# Patient Record
Sex: FEMALE | Race: WHITE | Employment: UNEMPLOYED | URBAN - METROPOLITAN AREA
[De-identification: names, ages, dates, MRNs, and addresses within clinical notes are randomized per-mention and may not be internally consistent; named-entity substitution may affect disease eponyms.]

---

## 2020-10-09 ENCOUNTER — APPOINTMENT (OUTPATIENT)
Dept: PHYSICAL THERAPY | Age: 50
End: 2020-10-09

## 2020-11-10 ENCOUNTER — HOSPITAL ENCOUNTER (OUTPATIENT)
Dept: PHYSICAL THERAPY | Age: 50
Discharge: HOME | End: 2020-11-10
Payer: OTHER GOVERNMENT

## 2020-11-10 ENCOUNTER — APPOINTMENT (OUTPATIENT)
Dept: PHYSICAL THERAPY | Age: 50
End: 2020-11-10

## 2020-11-10 PROCEDURE — 97112 NEUROMUSCULAR REEDUCATION: CPT

## 2020-11-10 PROCEDURE — 97162 PT EVAL MOD COMPLEX 30 MIN: CPT

## 2020-11-10 NOTE — PROGRESS NOTES
Nicolle Mccarty 32  [facility] AT [address], [address], [address] - Phone: [phone]  Fax: [phone] / [facility]  Patient Name: Tristen Palmer : 1970   Medical   Diagnosis: Dyspareunia, female [N94.10]  Urge incontinence [N39.41] Treatment Diagnosis: Dyspareunia, female [N94.10]  Urge incontinence [N39.41]   Onset Date: 2019     Referral Source: Fabiola Calderon [facility]): 11/10/2020   Prior Hospitalization: See medical history Provider #: [id number]   Prior Level of Function: Symptom free with ADLs   Comorbidities: G2, P4, Asthma, Bladder sling with mesh x2, Uterine ablation, Tummy tuck   Medications: Verified on Patient Summary List   The Plan of Care and following information is based on the information from the initial evaluation.   ==================================================================================  Assessment / key information:  Patient is a 53 y.o. yo female who presents to [facility] with diagnosis of Dyspareunia, female [N94.10]  Urge incontinence [N39.41]. Patient reports left labia, pubic and groin pain with sitting > 3-4 hours and with sexual intercourse in missionary position, difficulty achieving orgasm and urgency with inability to delay urination at all several times weekly 3-5. Rochelle Boop Patient is G2, P4 with  section delivery of  triplets and 1 vaginal deliveries. She had surgery for bladder sling with mesh  for severe urinary incontinence but had complications afterward. She underwent removal of mesh with new bladder sling 2020. Upon objective evaluation, patient demonstrates pelvic floor dysfunction. There was tenderness to palpation over left ischiocavernous and pubic scar. Strength of pelvic floor muscles was impaired at 2/5 with PERF score 2/3/10/10.    On biofeedback patient presented with elevated resting tone of pelvic floor at  4.16 microvolts. There was low net rise of fast twitch contraction at 21.6 microvolts and low net rise of slow twitch contraction at 12.6 microvolts. Patient scored 64 on FOTO/Urinary problem indicating decreased quality of life. Patient can benefit from PT for retraining of muscle control and relaxation on biofeedback, manual therapy, therapeutic exercises and modalities to decrease muscular tenderness and pain, Increase ability to engage in sex, achieve orgasm and delay urination to improve quality of life.  ==================================================================================  Eval Complexity: History: HIGH Complexity :3+ comorbidities / personal factors will impact the outcome/ POC Exam:HIGH Complexity : 4+ Standardized tests and measures addressing body structure, function, activity limitation and / or participation in recreation  Presentation: MEDIUM Complexity : Evolving with changing characteristics  Clinical Decision Making:MEDIUM Complexity : FOTO score of 26-74Overall Complexity:MEDIUM  Problem List: Pelvic pain/dysfunction, Decreased pelvic floor mm awareness, Decreased pelvic floor mm strength, Hypertonus of pelvic floor, Urinary urgency and Other   Treatment Plan may include any combination of the following: Therapeutic exercise, Urge suppression techniques, Neuromuscular re-education, Manual therapy, Physical agent/modality and Patient education  Patient / Family readiness to learn indicated by: asking questions, trying to perform skills and interest  Persons(s) to be included in education: patient (P)  Barriers to Learning/Limitations: None  Measures taken:    Patient Goal (s): No pain with sex or extended sitting, easier time achieving orgasm, no urgency or leaking   Patient self reported health status: excellent  Rehabilitation Potential: good  Short Term Goals: To be accomplished in 4 weeks:     1) Patient performing pelvic floor exercises 3x day. 2) Patient will report 20% improvement in pain with sexual intercourse. 3) Patient using urge suppression techniques     4) Increase net rise of slow twitch contraction to 15 microvolts to increase pelvic support . Long Term Goals: To be accomplished in 8 weeks:   1) Patient independent in HEP. 2) Patient will report 40% improvement in pain with sexual intercourse. 3) Increase score on FOTO/Urinary problem to 70 to indicate improved quality of life. 4) Increase net rise of slow twitch contraction to 17 microvolts to increase pelvic support. Frequency / Duration:   Patient to be seen  1  times per week for 8  weeks:  Patient / Caregiver education and instruction: Pain Management, Exercises and Bladder Retraining  G-Codes (GP): na  Therapist Signature: Chinyere Lopez PT Date: 29/56/7466   Certification Period: na Time: 10:55 AM   ==================================================================================  I certify that the above Physical Therapy Services are being furnished while the patient is under my care. I agree with the treatment plan and certify that this therapy is necessary. Physician Signature:        Date:       Time:     Please sign and return to [facility] or you may fax the signed copy to [phone]. Thank you.

## 2020-11-10 NOTE — PROGRESS NOTES
PELVIC FLOOR DAILY TREATMENT NOTE 8-14    Patient Name: Rondel Primrose  Date:11/10/2020  : 1970  [x]  Patient  Verified  Payor:  / Plan: Jocelyn Chaidez / Product Type:  /    In time:10:51  Out time:12:00  Total Treatment Time (min): 69  Total Timed Codes (min): na  1:1 Treatment Time (min): na   Visit #: 1 of 8    Treatment Area: Dyspareunia, female [N94.10]  Urge incontinence [N39.41]    SUBJECTIVE  Pain Level (0-10 scale): 0  Any medication changes, allergies to medications, adverse drug reactions, diagnosis change, or new procedure performed?: [x] No    [] Yes (see summary sheet for update)  Subjective functional status/changes:   [] No changes reported  See POC    OBJECTIVE          15 min Patient Education: [x] Review HEP    [] Progressed/Changed HEP based on: Educated Pt in pelvic floor anatomy, function/dysfunction and correct execution of a pelvic floor contraction. Reviewed biofeedback results. [] positioning   [] body mechanics   [] transfers   [] heat/ice application          Pain Level (0-10 scale) post treatment: 0    ASSESSMENT/Changes in Function: Justification for Eval Code Complexity:  Patient History : See POC  Examination see exam   Clinical Presentation: evolving  Clinical Decision Making : FOTO : 59 /100    Patient will continue to benefit from skilled PT services to modify and progress therapeutic interventions, address functional mobility deficits, address strength deficits, analyze and address soft tissue restrictions, instruct in home and community integration and address dyspareunia and UI to attain remaining goals. [x]  See Plan of Care  []  See progress note/recertification  []  See Discharge Summary         Progress towards goals / Updated goals:  Initial evaluation completed with home exercise program and education initiated.        PLAN  [x]  Upgrade activities as tolerated     []  Continue plan of care  []  Update interventions per flow sheet []  Discharge due to:_  []  Other:_      Adrienne García, PT 11/10/2020  10:51 AM      Future Appointments   Date Time Provider Felix Vernon   12/18/2020  2:15 PM Cleo Dunlap, PT [facility]   12/23/2020  2:45 PM [facility] Dunlap, [facility]   12/30/2020  2:45 PM Cleo Dunlap, PT [facility]   1/6/2021  2:45 PM Cleo Dunlap, PT [facility]   1/13/2021  2:45 PM Cleo Dunlap, PT [facility]   1/20/2021  2:45 PM Cleo Dunlap, PT [facility]

## 2020-12-08 ENCOUNTER — HOSPITAL ENCOUNTER (OUTPATIENT)
Dept: PHYSICAL THERAPY | Age: 50
Discharge: HOME | End: 2020-12-08
Payer: OTHER GOVERNMENT

## 2020-12-08 PROCEDURE — 97112 NEUROMUSCULAR REEDUCATION: CPT

## 2020-12-08 PROCEDURE — 97140 MANUAL THERAPY 1/> REGIONS: CPT

## 2020-12-08 NOTE — PROGRESS NOTES
PELVIC FLOOR DAILY TREATMENT NOTE 8-    Patient Name: Emilee Hernandez  Date:2020  : 1970  [x]  Patient  Verified  Payor:  / Plan: Diomedes Gan 74 / Product Type:  /    In time:11:21  Out time:12:06  Total Treatment Time (min): 45  Total Timed Codes (min): na  1:1 Treatment Time (min): na   Visit #: 2 of 8    Treatment Area: Dyspareunia, female [N94.10]  Urge incontinence [N39.41]    SUBJECTIVE  Pain Level (0-10 scale): 0  Any medication changes, allergies to medications, adverse drug reactions, diagnosis change, or new procedure performed?: [x] No    [] Yes (see summary sheet for update)  Subjective functional status/changes:   [] No changes reported  Patient reports doing HEP 2-3x day. Functional improvements include patient reporting decreased urgency. Functional impairments include strength of orgasm. OBJECTIVE  Modality rationale: Neuromuscular reeducation to improve the patients  Patient reports left labia, pubic and groin pain with sitting > 3-4 hours and with sexual intercourse in [address] position, difficulty achieving orgasm and urgency with inability to delay urination at all several times weekly 3-5. .  .    Min Type Additional Details   25 [x] Biofeedback x 25 minutes    supine surface    [] Estim: []Att   []Unatt        []TENS instruct                  []IFC  []Premod   []NMES                     []Other:  []w/US   []w/ice   []w/heat  Position:  Location:    []  Traction: [] Cervical       []Lumbar                       [] Prone          []Supine                       []Intermittent   []Continuous Lbs:  [] before manual  [] after manual    []  Ultrasound: []Continuous   [] Pulsed                           []1MHz   []3MHz Location:  W/cm2:    []  Iontophoresis with dexamethasone         Location: [] Take home patch   [] In clinic    []  Ice     []  heat  []  Ice massage Position:  Location:    []  Vasopneumatic Device Pressure:       [] lo [] med [] hi Temperature: [] lo [] med [] hi   [x] Skin assessment post-treatment:  [x]intact []redness- no adverse reaction       []redness  adverse reaction:     na min Therapeutic Exercise:  [] See flow sheet :  []  Pelvic floor strengthening                []  Pelvic floor downtraining  []  Quality pelvic floor contractions      []  Relaxation techniques  []  Urge suppression exercises  []  Other: Core strengthening   Rationale: na to improve the patients ability to na     min Therapeutic Activity:  []  See flow sheet :   Rationale:        20 min Manual Therapy: Release to left ischiocavernosis muscle. Scar mobs to pubic scar. Patient educated in self massage and scar mos. Rationale: Increase tissue extensibility to improve patient's ability to engage in sexual intercourse and to sit > 3-4 hours      na min Bladder Training: [] voiding schedule          [] program progression                                  [] decrease every na minutes/hours            min Patient Education: [x] Review HEP    [] Progressed/Changed HEP based on: Patient to perform HEP in supine/sitting position. Increase slow twitch contraction to 5second holds. Scar mobs 1x day, self massage in perineum 3x week. [] positioning   [] body mechanics   [] transfers   [] heat/ice application        Other Objective/Functional Measures:    [x]baseline resting tone: na   [x]slow twitch mms 40. 2(28.3) in supine. [x]fast twitch mms 55(41.5)    Pain Level (0-10 scale) post treatment: 0    ASSESSMENT/Changes in Function: Patient demonstrates improved strength of pelvic floor muscles with decreased urgency. Patient will continue to benefit from skilled PT services to modify and progress therapeutic interventions, address functional mobility deficits, address strength deficits, analyze and address soft tissue restrictions, instruct in home and community integration and address dyspareunia and UI to attain remaining goals.        []  See Plan of Care  [] See progress note/recertification  []  See Discharge Summary         Progress towards goals / Updated goals:               1) Patient performing pelvic floor exercises 3x day. Progressing               2) Patient will report 20% improvement in pain with sexual intercourse. 3) Patient using urge suppression techniques                 4) Increase net rise of slow twitch contraction to 15 microvolts to increase pelvic support .  Met    PLAN  [x]  Upgrade activities as tolerated     []  Continue plan of care  []  Update interventions per flow sheet       []  Discharge due to:_  []  Other:_      Bradley Hankins, PT 12/8/2020  12:06 PM      Future Appointments   Date Time Provider Felix Vernon   12/8/2020 11:15 AM Zoya Kuwaiti, PT [facility] [address]   12/18/2020  2:15 PM Zoya Kuwaiti, PT [facility] [address]   12/23/2020  2:45 PM Zoya Kuwaiti, PT [facility] [address]   12/30/2020  2:45 PM Zoya Kuwaiti, PT [facility] [address]   1/6/2021  2:45 PM Zoya Kuwaiti, PT [facility] [address]   1/13/2021  2:45 PM Zoya Kuwaiti, PT [facility] [address]   1/20/2021  2:45 PM Zoya Kuwaiti, PT [facility] [address]

## 2020-12-18 ENCOUNTER — HOSPITAL ENCOUNTER (OUTPATIENT)
Dept: PHYSICAL THERAPY | Age: 50
Discharge: HOME | End: 2020-12-18
Payer: OTHER GOVERNMENT

## 2020-12-18 PROCEDURE — 97112 NEUROMUSCULAR REEDUCATION: CPT

## 2020-12-18 PROCEDURE — 97140 MANUAL THERAPY 1/> REGIONS: CPT

## 2020-12-18 NOTE — PROGRESS NOTES
PELVIC FLOOR DAILY TREATMENT NOTE 8-14    Patient Name: Mickie Matthews  Date:2020  : 1970  [x]  Patient  Verified  Payor:  / Plan: Diomedes Gan 74 / Product Type:  /    In time: 2:25  Out time: 3:20  Total Treatment Time (min): 55  Total Timed Codes (min): na  1:1 Treatment Time (min): na   Visit #: 3 of 8    Treatment Area: Dyspareunia, female [N94.10]  Urge incontinence [N39.41]    SUBJECTIVE  Pain Level (0-10 scale): 0  Any medication changes, allergies to medications, adverse drug reactions, diagnosis change, or new procedure performed?: [x] No    [] Yes (see summary sheet for update)  Subjective functional status/changes:   [] No changes reported  See PN    OBJECTIVE  Modality rationale: Neuromuscular reeducation to improve the patients  left labia, pubic and groin pain with sitting > 3-4 hours and with sexual intercourse in missionary position, difficulty achieving orgasm and urgency with inability to delay urination at all several times weekly 3-5.    Min Type Additional Details   45 [x] Biofeedback x 45 minutes    supine surface    [] Estim: []Att   []Unatt        []TENS instruct                  []IFC  []Premod   []NMES                     []Other:  []w/US   []w/ice   []w/heat  Position:  Location:    []  Traction: [] Cervical       []Lumbar                       [] Prone          []Supine                       []Intermittent   []Continuous Lbs:  [] before manual  [] after manual    []  Ultrasound: []Continuous   [] Pulsed                           []1MHz   []3MHz Location:  W/cm2:    []  Iontophoresis with dexamethasone         Location: [] Take home patch   [] In clinic    []  Ice     []  heat  []  Ice massage Position:  Location:    []  Vasopneumatic Device Pressure:       [] lo [] med [] hi   Temperature: [] lo [] med [] hi   [x] Skin assessment post-treatment:  [x]intact []redness- no adverse reaction       []redness  adverse reaction:     na min Therapeutic Exercise:  [] See flow sheet :  []  Pelvic floor strengthening                []  Pelvic floor downtraining  []  Quality pelvic floor contractions      []  Relaxation techniques  []  Urge suppression exercises  []  Other: Core strengthening   Rationale: na to improve the patients ability to na     min Therapeutic Activity:  []  See flow sheet :   Rationale:        10 min Manual Therapy: Release to left ischiocavernosis muscle. Scar mobs to pubic scar. Continued patient education in self massage and scar mos. Rationale: Increase tissue extensibility to improve patient's ability to engage in sexual intercourse and to sit > 3-4 hours      na min Bladder Training: [] voiding schedule          [] program progression                                  [] decrease every na minutes/hours           with NM min Patient Education: [x] Review HEP    [] Progressed/Changed HEP based on: Patient to perform HEP in supine/sitting position. Increase slow twitch contraction to 7 second holds. Scar mobs 1x day, self massage in perineum 1x week. Education on relationship of pelvic floor muscle strength to orgasm. [] positioning   [] body mechanics   [] transfers   [] heat/ice application        Other Objective/Functional Measures:    [x]baseline resting tone: na   [x]slow twitch mms 38.5(29.2) in supine. [x]fast twitch mms 44. 6(24.6)    Pain Level (0-10 scale) post treatment: 0    ASSESSMENT/Changes in Function: See PN    Patient will continue to benefit from skilled PT services to modify and progress therapeutic interventions, address functional mobility deficits, address strength deficits, analyze and address soft tissue restrictions, instruct in home and community integration and address dyspareunia and UI to attain remaining goals.        []  See Plan of Care  [x]  See progress note/recertification  []  See Discharge Summary         Progress towards goals / Updated goals:              See PN  PLAN  [x]  Upgrade activities as tolerated     []  Continue plan of care  []  Update interventions per flow sheet       []  Discharge due to:_  []  Other:_      Alexandra Small, PT 12/18/2020  3:20 PM      Future Appointments   Date Time Provider Felix Vernon   12/18/2020  2:15 PM Cotyasha De Jesus, PT [facility]   12/23/2020  2:45 PM Cotyasha De Jesus, PT [facility]   12/30/2020  2:45 PM Cotyasha De Jesus, PT [facility]   1/6/2021  2:45 PM Coty De Jesus, PT [facility]   1/13/2021  2:45 PM Coty De Jesus, PT [facility]   1/20/2021  2:45 PM Coty De Jesus, PT [facility]

## 2020-12-18 NOTE — PROGRESS NOTES
[address] [place] [facility]AP[facility]  [x]  At [facility] [address] [place] [address] - Phone: [phone] Fax: [phone]    PROGRESS NOTE  Patient Name: Rosalba Moore : 1970   Treatment/Medical Diagnosis: Dyspareunia, female [N94.10]  Urge incontinence [N39.41]   Referral Source: Dianna Velázquez MD     Date of Initial Visit: 11/10/2020 Attended Visits: 3 Missed Visits: 0   SUMMARY OF TREATMENT  PT has consisted of pelvic floor relaxation/strengthening via biofeedback, education as to pelvic floor anatomy and function/self massage/scar mobilization , manual therapy and home exercise program.   CURRENT STATUS  Patient has made good progress in PT with short term goals either met or ongoing. Functional progress Includes patient reporting no pain with sexual intercourse. Patient demonstrates improved pelvic floor muscle strength. Functional impairments include decreased intensity of orgasms and urinary urgency. Goal/Measure of Progress Goal Met? 1. Patient performing pelvic floor exercises 3x day   Status at last Eval: na Current Status: 3x day yes   2. Patient will report 20% improvement in pain with sexual intercourse. Status at last Eval: na Current Status: No pain with sexual intercourse yes   3. Patient using urge suppression techniques                 4) Increase net rise of slow twitch contraction to 15 microvolts to increase pelvic support . Status at last Eval: Na  12.6 Current Status: Ongoing  29.2 Ongoing  yes   New Goals to be achieved in __4__  weeks:                 1) Patient independent in HEP. 2) Patient will report 25% improvement in intensity of orgasm with sexual intercourse. 3) Increase score on FOTO/Urinary problem to 70 to indicate improved quality of life. 4) Increase net rise of slow twitch contraction to 32 microvolts to increase pelvic support. 5) Patient using urge suppression techniques. RECOMMENDATIONS  Continue pelvic floor PT 1x week for 4 weeks. If you have any questions/comments please contact us directly at [phone]. Thank you for allowing us to assist in the care of your patient. Therapist Signature: Amada Overton PT Date: 12/18/2020     Time: 7:31 AM   NOTE TO PHYSICIAN:  PLEASE COMPLETE THE ORDERS BELOW AND FAX TO   [facility]: [phone]. If you are unable to process this request in 24 hours please contact our office: [phone]    ___ I have read the above report and request that my patient continue as recommended.   ___ I have read the above report and request that my patient continue therapy with the following changes/special instructions:_________________________________________________________   ___ I have read the above report and request that my patient be discharged from therapy.      Physician Signature:        Date:       Time:

## 2020-12-23 ENCOUNTER — APPOINTMENT (OUTPATIENT)
Dept: PHYSICAL THERAPY | Age: 50
End: 2020-12-23
Payer: OTHER GOVERNMENT

## 2020-12-30 ENCOUNTER — APPOINTMENT (OUTPATIENT)
Dept: PHYSICAL THERAPY | Age: 50
End: 2020-12-30
Payer: OTHER GOVERNMENT

## 2021-01-06 ENCOUNTER — APPOINTMENT (OUTPATIENT)
Dept: PHYSICAL THERAPY | Age: 51
End: 2021-01-06
Payer: OTHER GOVERNMENT

## 2021-01-13 ENCOUNTER — HOSPITAL ENCOUNTER (OUTPATIENT)
Dept: PHYSICAL THERAPY | Age: 51
Discharge: HOME | End: 2021-01-13
Payer: OTHER GOVERNMENT

## 2021-01-13 PROCEDURE — 97140 MANUAL THERAPY 1/> REGIONS: CPT

## 2021-01-13 PROCEDURE — 97112 NEUROMUSCULAR REEDUCATION: CPT

## 2021-01-13 NOTE — PROGRESS NOTES
PELVIC FLOOR DAILY TREATMENT NOTE 8-14    Patient Name: Ramila Mccann  Date:2021  : 1970  [x]  Patient  Verified  Payor:  / Plan: Diomedes Gan 74 / Product Type:  /    In time: 2:33  Out time: 3:20  Total Treatment Time (min): 47  Total Timed Codes (min): na  1:1 Treatment Time (min): na   Visit #: 4 of 8    Treatment Area: Dyspareunia, female [N94.10]  Urge incontinence [N39.41]    SUBJECTIVE  Pain Level (0-10 scale): 0  Any medication changes, allergies to medications, adverse drug reactions, diagnosis change, or new procedure performed?: [] No    [x] Yes (see summary sheet for update)  Subjective functional status/changes:   [] No changes reported  Patient reports that she had covid. Began 2020. Tested + on the 2020. Developed pneumonia after 12 days. Has some residual decreased lung function and fatigue. Okayed to be in the public by MD.     OBJECTIVE  Modality rationale: Neuromuscular reeducation to improve the patients  left labia, pubic and groin pain with sitting > 3-4 hours and with sexual intercourse in missionary position, difficulty achieving orgasm and urgency with inability to delay urination at all several times weekly 3-5.    Min Type Additional Details   30 [x] Biofeedback x 30 minutes    supine surface    [] Estim: []Att   []Unatt        []TENS instruct                  []IFC  []Premod   []NMES                     []Other:  []w/US   []w/ice   []w/heat  Position:  Location:    []  Traction: [] Cervical       []Lumbar                       [] Prone          []Supine                       []Intermittent   []Continuous Lbs:  [] before manual  [] after manual    []  Ultrasound: []Continuous   [] Pulsed                           []1MHz   []3MHz Location:  W/cm2:    []  Iontophoresis with dexamethasone         Location: [] Take home patch   [] In clinic    []  Ice     []  heat  []  Ice massage Position:  Location:    []  Vasopneumatic Device Pressure:       [] lo [] med [] hi   Temperature: [] lo [] med [] hi   [x] Skin assessment post-treatment:  [x]intact []redness- no adverse reaction       []redness - adverse reaction:     na min Therapeutic Exercise:  [] See flow sheet :  []  Pelvic floor strengthening                []  Pelvic floor downtraining  []  Quality pelvic floor contractions      []  Relaxation techniques  []  Urge suppression exercises  []  Other: Core strengthening   Rationale: na to improve the patients ability to na     min Therapeutic Activity:  []  See flow sheet :   Rationale:        17 min Manual Therapy: Release to left ischiocavernosis muscle. Scar mobs to pubic scar. Continued patient education in self massage and scar mos. Rationale: Increase tissue extensibility to improve patient's ability to engage in sexual intercourse and to sit > 3-4 hours      na min Bladder Training: [] voiding schedule          [] program progression                                  [] decrease every na minutes/hours            min Patient Education: [x] Review HEP    [] Progressed/Changed HEP based on: Patient to perform HEP in supine/sitting position. Decrease slow twitch contraction to 5 second holds. Continue scar mobs 1x day, self massage in perineum 1x week. [] positioning   [] body mechanics   [] transfers   [] heat/ice application        Other Objective/Functional Measures:    [x]baseline resting tone: na   [x]slow twitch mms 47. 9(40.3) in supine. [x]fast twitch mms 58.7(43.7)    Pain Level (0-10 scale) post treatment: 0    ASSESSMENT/Changes in Function: Increased net rise of both fast and slow twitch contractions but poor quality of slow twitch so decreased hold to 5 seconds. .    Patient will continue to benefit from skilled PT services to modify and progress therapeutic interventions, address functional mobility deficits, address strength deficits, analyze and address soft tissue restrictions, instruct in home and community integration and address dyspareunia and UI to attain remaining goals. []  See Plan of Care  []  See progress note/recertification  []  See Discharge Summary         Progress towards goals / Updated goals:              1) Patient independent in HEP.                 2) Patient will report 25% improvement in intensity of orgasm with sexual intercourse.                 3) Increase score on FOTO/Urinary problem to 70 to indicate improved quality of life.                 4) Increase net rise of slow twitch contraction to 32 microvolts to increase pelvic support. Met               5) Patient using urge suppression techniques.               PLAN  [x]  Upgrade activities as tolerated     []  Continue plan of care  []  Update interventions per flow sheet       []  Discharge due to:_  []  Other:_      Lissa Lee, PT 1/13/2021  3:20 PM      Future Appointments   Date Time Provider Felix Vernon   1/20/2021  2:45 PM Andrew Iraheta, PT [facility]   2/5/2021 10:45 AM Andrew Iraheta PT [facility]   2/10/2021  1:15 PM Andrew Iraheta PT [facility]   2/24/2021  2:00 PM Andrew Iraheta, PT [phone]

## 2021-01-20 ENCOUNTER — HOSPITAL ENCOUNTER (OUTPATIENT)
Dept: PHYSICAL THERAPY | Age: 51
Discharge: HOME | End: 2021-01-20
Payer: OTHER GOVERNMENT

## 2021-01-20 PROCEDURE — 97112 NEUROMUSCULAR REEDUCATION: CPT

## 2021-01-20 PROCEDURE — 97110 THERAPEUTIC EXERCISES: CPT

## 2021-01-20 PROCEDURE — 97140 MANUAL THERAPY 1/> REGIONS: CPT

## 2021-01-20 NOTE — PROGRESS NOTES
[facility]  [x]  At [facility] [address], [facility] [address] - Phone: [phone] Fax: [phone]    PROGRESS NOTE  Patient Name: Torie Leger : 1970   Treatment/Medical Diagnosis: Dyspareunia, female [N94.10]  Urge incontinence [N39.41]   Referral Source: Marion Sears MD     Date of Initial Visit: 11/10/2020 Attended Visits: 5 Missed Visits: 1   SUMMARY OF TREATMENT  PT has consisted of pelvic floor relaxation/strengthening via biofeedback, education as to pelvic floor anatomy and function/scar mobilization, manual therapy and home exercise program.   CURRENT STATUS  Patient has made slow progress in PT with long term goals partially met.  Functional progress Includes patient reporting 50% improvement in urinary urgency. She has had no change in left labia, pubic and groin pain with sitting and still has difficulty achieving orgasm.  Patient demonstrates improved pelvic floor muscle strength.    Goal/Measure of Progress Goal Met?   1.  Patient independent in HEP.     Status at last Eval: progressing Current Status: progressing progressing   2.  Patient will report 25% improvement in intensity of orgasm with sexual intercourse.   Status at last Eval: na Current Status: 0% no   3.  Increase score on FOTO/Urinary problem to 70 to indicate improved quality of life.                 4) Increase net rise of slow twitch contraction to 32 microvolts to increase pelvic support.               5) Patient using urge suppression techniques.    Status at last Eval: 64  29.2  na Current Status: ongoing  32.3  Patient educated Ongoing  Yes  progressing   New Goals to be achieved in __4__  weeks:                1) Patient independent in HEP.                 2) Patient will report 25% improvement in pain with sitting.               3) Increase score on FOTO/Urinary problem to 70 to indicate improved quality of life.                 4)  Increase net rise of slow twitch contraction to 34 microvolts to increase pelvic support. 5) Patient using urge suppression techniques. RECOMMENDATIONS  Continue pelvic floor PT 1x week for 4 weeks. If you have any questions/comments please contact us directly at [phone]. Thank you for allowing us to assist in the care of your patient. Therapist Signature: Vianca Avila PT Date: 1/20/2021     Time: 3:48 PM   NOTE TO PHYSICIAN:  PLEASE COMPLETE THE ORDERS BELOW AND FAX TO   [facility], [facility] [phone]. If you are unable to process this request in 24 hours please contact our office: [phone]    ___ I have read the above report and request that my patient continue as recommended.   ___ I have read the above report and request that my patient continue therapy with the following changes/special instructions:_________________________________________________________   ___ I have read the above report and request that my patient be discharged from therapy.      Physician Signature:        Date:       Time:

## 2021-01-20 NOTE — PROGRESS NOTES
PELVIC FLOOR DAILY TREATMENT NOTE 8-14    Patient Name: Reny Meraz  Date:2021  : 1970  [x]  Patient  Verified  Payor:  / Plan: Diomedes Gan 74 / Product Type:  /    In time: 2:50  Out time: 3:48  Total Treatment Time (min): 58  Total Timed Codes (min): na  1:1 Treatment Time (min): na   Visit #: 5 of 8    Treatment Area: Dyspareunia, female [N94.10]  Urge incontinence [N39.41]    SUBJECTIVE  Pain Level (0-10 scale): 0  Any medication changes, allergies to medications, adverse drug reactions, diagnosis change, or new procedure performed?: [] No    [x] Yes (see summary sheet for update)  Subjective functional status/changes:   [] No changes reported  See PN     OBJECTIVE  Modality rationale: Neuromuscular reeducation to improve the patients  left labia, pubic and groin pain with sitting > 3-4 hours and with sexual intercourse in missionary position, difficulty achieving orgasm and urgency with inability to delay urination at all several times weekly 3-5.    Min Type Additional Details   25 [x] Biofeedback x 25 minutes    sit surface    [] Estim: []Att   []Unatt        []TENS instruct                  []IFC  []Premod   []NMES                     []Other:  []w/US   []w/ice   []w/heat  Position:  Location:    []  Traction: [] Cervical       []Lumbar                       [] Prone          []Supine                       []Intermittent   []Continuous Lbs:  [] before manual  [] after manual    []  Ultrasound: []Continuous   [] Pulsed                           []1MHz   []3MHz Location:  W/cm2:    []  Iontophoresis with dexamethasone         Location: [] Take home patch   [] In clinic    []  Ice     []  heat  []  Ice massage Position:  Location:    []  Vasopneumatic Device Pressure:       [] lo [] med [] hi   Temperature: [] lo [] med [] hi   [x] Skin assessment post-treatment:  [x]intact []redness- no adverse reaction       []redness - adverse reaction:     na min Therapeutic Exercise:  [] See flow sheet :  []  Pelvic floor strengthening                []  Pelvic floor downtraining  []  Quality pelvic floor contractions      []  Relaxation techniques  []  Urge suppression exercises  []  Other: Core strengthening   Rationale: na to improve the patients ability to na     min Therapeutic Activity:  []  See flow sheet :   Rationale:        20 min Manual Therapy:  Release to left ischiocavernosis muscle in perineum, bilateral pubococcygeus and obturator internus muscles vaginally. Scar mobs to pubic scar. Continued patient education in self massage and scar mos. Rationale: Increase tissue extensibility to improve patient's ability to engage in sexual intercourse and to sit > 3-4 hours      na min Bladder Training: [] voiding schedule          [] program progression                                  [] decrease every na minutes/hours           13 min Patient Education: [x] Review HEP    [] Progressed/Changed HEP based on: Urge suppression techniques. Discussed progress including functional improvements and impairments. Patient to perform HEP in standing/sitting position. Increase slow twitch contraction to 7 second holds. Continue scar mobs 1x day, self massage in perineum 1x week. [] positioning   [] body mechanics   [] transfers   [] heat/ice application        Other Objective/Functional Measures:    [x]baseline resting tone: na   [x]slow twitch mms 37.9(32.3) in supine. [x]fast twitch mms 37.2(28.7)    Pain Level (0-10 scale) post treatment: 0    ASSESSMENT/Changes in Function: See PN    Patient will continue to benefit from skilled PT services to modify and progress therapeutic interventions, address functional mobility deficits, address strength deficits, analyze and address soft tissue restrictions, instruct in home and community integration and address dyspareunia and UI to attain remaining goals.        []  See Plan of Care  [x]  See progress note/recertification  []  See Discharge Summary         Progress towards goals / Updated goals:              See PN              PLAN  [x]  Upgrade activities as tolerated     []  Continue plan of care  []  Update interventions per flow sheet       []  Discharge due to:_  []  Other:_      Sapna Chowdhury, PT 1/20/2021  3:48 PM      Future Appointments   Date Time Provider Felix Vernon   1/20/2021  2:45 PM Maria Esther Harmon, PT [facility]   2/5/2021 10:45 AM Maria Esther Harmon, PT [facility]   2/10/2021  1:15 PM Maria Esther Harmon, PT [facility]   2/24/2021  2:00 PM Maria Esther Harmon, PT [facility]

## 2021-01-27 ENCOUNTER — HOSPITAL ENCOUNTER (OUTPATIENT)
Dept: PHYSICAL THERAPY | Age: 51
Discharge: HOME | End: 2021-01-27
Payer: OTHER GOVERNMENT

## 2021-01-27 PROCEDURE — 97140 MANUAL THERAPY 1/> REGIONS: CPT

## 2021-01-27 PROCEDURE — 97110 THERAPEUTIC EXERCISES: CPT

## 2021-01-27 PROCEDURE — 97112 NEUROMUSCULAR REEDUCATION: CPT

## 2021-01-27 NOTE — PROGRESS NOTES
PELVIC FLOOR DAILY TREATMENT NOTE 8-14    Patient Name: Isabell Martinez  Date:2021  : 1970  [x]  Patient  Verified  Payor:  / Plan: Grisel Mota / Product Type:  /    In time: 2:20  Out time: 3:24  Total Treatment Time (min): 64  Tootal Timed Codes (min): na  1:1 Treatment Time (min): na   Visit #: 6 of 8    Treatment Area: Dyspareunia, female [N94.10]  Urge incontinence [N39.41]    SUBJECTIVE  Pain Level (0-10 scale): 0  Any medication changes, allergies to medications, adverse drug reactions, diagnosis change, or new procedure performed?: [] No    [x] Yes (see summary sheet for update)  Subjective functional status/changes:   [] No changes reported  Patient reports doing HEP 3x day. OBJECTIVE  Modality rationale: Neuromuscular reeducation to improve the patients  left labia, pubic and groin pain with sitting > 3-4 hours and with sexual intercourse in missionary position, difficulty achieving orgasm and urgency with inability to delay urination at all several times weekly 3-5.    Min Type Additional Details   20 [x] Biofeedback x 20 minutes    stand surface    [] Estim: []Att   []Unatt        []TENS instruct                  []IFC  []Premod   []NMES                     []Other:  []w/US   []w/ice   []w/heat  Position:  Location:    []  Traction: [] Cervical       []Lumbar                       [] Prone          []Supine                       []Intermittent   []Continuous Lbs:  [] before manual  [] after manual    []  Ultrasound: []Continuous   [] Pulsed                           []1MHz   []3MHz Location:  W/cm2:    []  Iontophoresis with dexamethasone         Location: [] Take home patch   [] In clinic   10 [x]  Ice     []  heat  []  Ice massage Position: supine  Location: perineum and deep hip rotators    []  Vasopneumatic Device Pressure:       [] lo [] med [] hi   Temperature: [] lo [] med [] hi   [x] Skin assessment post-treatment:  [x]intact []redness- no adverse reaction       []redness - adverse reaction:     na min Therapeutic Exercise:  [] See flow sheet :  []  Pelvic floor strengthening                []  Pelvic floor downtraining  []  Quality pelvic floor contractions      []  Relaxation techniques  []  Urge suppression exercises  []  Other: Core strengthening   Rationale: na to improve the patients ability to na     min Therapeutic Activity:  []  See flow sheet :   Rationale:        24 min Manual Therapy:  Release to left ischiocavernosis and bulbocavernosus muscle in perineum, bilateral pubococcygeus and obturator internus muscles vaginally left > right. Scar mobs to pubic scar. Continued patient education in self massage and scar mobs. Rationale: Increase tissue extensibility to improve patient's ability to engage in sexual intercourse and to sit > 3-4 hours      na min Bladder Training: [] voiding schedule          [] program progression                                  [] decrease every na minutes/hours           10 min Patient Education: [x] Review HEP    [] Progressed/Changed HEP based on: Increase slow twitch contraction to 10 second holds. Continue scar mobs 1x day, self massage in perineum 2x week. add piriformis stretch and ice to perineum 1x day. Issued information on therawand. [] positioning   [] body mechanics   [] transfers   [] heat/ice application        Other Objective/Functional Measures:    [x]baseline resting tone: na   [x]slow twitch mms 62.9(49.7) in standing   [x]fast twitch mms 66.2(43.9)    Pain Level (0-10 scale) post treatment: 0    ASSESSMENT/Changes in Function:  Increased pelvic floor muscle strength in standing but continued muscular tenderness in the perineum and vagina left> right.     Patient will continue to benefit from skilled PT services to modify and progress therapeutic interventions, address functional mobility deficits, address strength deficits, analyze and address soft tissue restrictions, instruct in home and community integration and address dyspareunia and UI to attain remaining goals.        []  See Plan of Care  []  See progress note/recertification  []  See Discharge Summary         Progress towards goals / Updated goals:              Continue with same goals              PLAN  [x]  Upgrade activities as tolerated     []  Continue plan of care  []  Update interventions per flow sheet       []  Discharge due to:_  []  Other:_      Curtis Llanos, PT 1/27/2021  3:48 PM      Future Appointments   Date Time Provider Felix Vernon   2/5/2021 10:45 AM Kristi Russell, PT [facility]   2/10/2021  1:15 PM Kristi Russell PT [facility]   2/24/2021  2:00 PM Kristi Russell, PT [phone]

## 2021-02-05 ENCOUNTER — APPOINTMENT (OUTPATIENT)
Dept: PHYSICAL THERAPY | Age: 51
End: 2021-02-05

## 2021-02-10 ENCOUNTER — APPOINTMENT (OUTPATIENT)
Dept: PHYSICAL THERAPY | Age: 51
End: 2021-02-10

## 2021-02-24 ENCOUNTER — HOSPITAL ENCOUNTER (OUTPATIENT)
Dept: PHYSICAL THERAPY | Age: 51
End: 2021-02-24

## 2021-02-24 NOTE — PROGRESS NOTES
[facility] PHYSICAL THERAPY  [x]  At [facility] [address] [address] - Phone: [phone] Fax: [phone]    DISCHARGE SUMMARY  Patient Name: Les Mendez : 1970   Treatment/Medical Diagnosis: Dyspareunia, female [N94.10]  Urge incontinence [N39.41]   Referral Source: Chhaya Jackson MD     Date of Initial Visit: 2020 Attended Visits: 6 Missed Visits: 1     SUMMARY OF TREATMENT  PT has consisted of pelvic floor relaxation/strengthening via biofeedback, education as to pelvic floor anatomy and function/scar mobilization, manual therapy and home exercise program.     CURRENT STATUS  Patient completed 6/8 treatments then was discharged  secondary to lack of progress and patient having further medical work up. Goal/Measure of Progress Goal Met? 1. Patient independent in HEP. Status at last Eval: progressing Current Status: Pt independent yes   2. Patient will report 25% improvement in pain with sitting. Status at last Eval: na Current Status: Unable to reassess no   3. Increase score on FOTO/Urinary problem to 70 to indicate improved quality of life.                 4) Increase net rise of slow twitch contraction to 32 microvolts to increase pelvic support.               5) Patient using urge suppression techniques. Status at last Eval: 59  32.3  Pt educated Current Status: Unable to reassess  49.7  Unable to reassess   No  Yes  no     RECOMMENDATIONS  Discontinue PT secondary to lack of progress. Patient to continue on home exercise program.  If you have any questions/comments please contact us directly at [phone]. Thank you for allowing us to assist in the care of your patient. Therapist Signature:  Rod January, PT Date: 2021     Time: 12:49 PM     Chhaya Jackson MD

## 2021-03-05 ENCOUNTER — APPOINTMENT (OUTPATIENT)
Dept: PHYSICAL THERAPY | Age: 51
End: 2021-03-05

## 2022-11-09 RX ORDER — FEXOFENADINE HCL 60 MG
60 TABLET ORAL DAILY
COMMUNITY
Start: 2022-04-14 | End: 2022-11-09 | Stop reason: DRUGHIGH

## 2022-11-09 RX ORDER — DIPHENHYDRAMINE HCL 25 MG
25 CAPSULE ORAL
COMMUNITY

## 2022-11-09 RX ORDER — MINERAL OIL
180 ENEMA (ML) RECTAL DAILY
COMMUNITY
Start: 2022-01-12

## 2022-11-09 RX ORDER — MONTELUKAST SODIUM 10 MG/1
10 TABLET ORAL DAILY
COMMUNITY
Start: 2022-04-14

## 2022-11-09 RX ORDER — SERTRALINE HYDROCHLORIDE 100 MG/1
TABLET, FILM COATED ORAL DAILY
COMMUNITY
Start: 2022-01-12

## 2022-11-09 RX ORDER — ALBUTEROL SULFATE 90 UG/1
2 AEROSOL, METERED RESPIRATORY (INHALATION) AS NEEDED
COMMUNITY

## 2022-11-09 RX ORDER — TRAMADOL HYDROCHLORIDE 50 MG/1
1 TABLET ORAL
COMMUNITY
Start: 2022-02-07 | End: 2022-11-18

## 2022-11-09 RX ORDER — EPINEPHRINE 0.3 MG/.3ML
INJECTION SUBCUTANEOUS AS NEEDED
COMMUNITY
Start: 2022-05-20

## 2022-11-09 RX ORDER — BUDESONIDE, GLYCOPYRROLATE, AND FORMOTEROL FUMARATE 160; 9; 4.8 UG/1; UG/1; UG/1
2 AEROSOL, METERED RESPIRATORY (INHALATION) DAILY
COMMUNITY
Start: 2022-05-10

## 2022-11-09 RX ORDER — ZOLPIDEM TARTRATE 10 MG/1
10 TABLET ORAL
COMMUNITY
Start: 2022-03-18

## 2022-11-09 RX ORDER — METRONIDAZOLE 10 MG/G
GEL TOPICAL 2 TIMES DAILY
COMMUNITY
Start: 2022-08-15

## 2022-11-09 RX ORDER — CARBOXYMETHYLCELLULOSE SODIUM 5 MG/ML
0.5 SOLUTION/ DROPS OPHTHALMIC
COMMUNITY
Start: 2021-12-21 | End: 2022-12-21

## 2022-11-09 NOTE — PERIOP NOTES
PRE-SURGICAL INSTRUCTIONS        Patient's Name:  Leeann Tobar      NLWEU'M Date:  11/9/2022            Covid Testing Date and Time: will bring card    Surgery Date:  11/17/2022                Do NOT eat or drink anything, including candy, gum, or ice chips after midnight on 11/17, unless you have specific instructions from your surgeon or anesthesia provider to do so. You may brush your teeth before coming to the hospital.  No smoking 24 hours prior to the day of surgery. No alcohol 24 hours prior to the day of surgery. No recreational drugs for one week prior to the day of surgery. Leave all valuables, including money/purse, at home. Remove all jewelry, nail polish, acrylic nails, and makeup (including mascara); no lotions powders, deodorant, or perfume/cologne/after shave on the skin. Follow instruction for Hibiclens washes and CHG wipes from surgeon's office. Glasses/contact lenses and dentures may be worn to the hospital.  They will be removed prior to surgery. Call your doctor if symptoms of a cold or illness develop within 24-48 hours prior to your surgery. 11.  If you are having an outpatient procedure, please make arrangements for a responsible ADULT TO [address] [place] [address] and stay with you for 24 hours after your surgery. 12. ONE VISITOR in the hospital at this time for outpatient procedures. Exceptions may be made for surgical admissions, per nursing unit guidelines      Special Instructions:      Bring list of CURRENT medications. Bring inhaler. Bring CPAP machine. Bring any pertinent legal medical records. Take these medications the morning of surgery with a sip of water:  as directed by MD  Follow physician instructions about stopping anticoagulants. On the day of surgery, come in the main entrance of [facility]. Let the  at the desk know you are there for surgery.   A staff member will come escort you to the surgical area on the second floor.    If you have any questions or concerns, please do not hesitate to call:     (Prior to the day of surgery) [facility] department:  [phone]   (Day of surgery) Pre-Op department:  [phone]    These surgical instructions were reviewed with Torie Leger during the [facility] phone call.

## 2022-11-16 ENCOUNTER — ANESTHESIA EVENT (OUTPATIENT)
Dept: SURGERY | Age: 52
DRG: 470 | End: 2022-11-16
Payer: OTHER GOVERNMENT

## 2022-11-17 ENCOUNTER — HOSPITAL ENCOUNTER (INPATIENT)
Age: 52
LOS: 1 days | Discharge: HOME | DRG: 470 | End: 2022-11-18
Attending: ORTHOPAEDIC SURGERY | Admitting: ORTHOPAEDIC SURGERY
Payer: OTHER GOVERNMENT

## 2022-11-17 ENCOUNTER — APPOINTMENT (OUTPATIENT)
Dept: GENERAL RADIOLOGY | Age: 52
DRG: 470 | End: 2022-11-17
Attending: ORTHOPAEDIC SURGERY
Payer: OTHER GOVERNMENT

## 2022-11-17 ENCOUNTER — ANESTHESIA (OUTPATIENT)
Dept: SURGERY | Age: 52
DRG: 470 | End: 2022-11-17
Payer: OTHER GOVERNMENT

## 2022-11-17 PROBLEM — M17.11 OSTEOARTHRITIS OF RIGHT KNEE: Status: ACTIVE | Noted: 2022-11-17

## 2022-11-17 PROBLEM — M17.11 OSTEOARTHRITIS OF RIGHT KNEE: Status: RESOLVED | Noted: 2022-11-17 | Resolved: 2022-11-17

## 2022-11-17 LAB
GLUCOSE BLD STRIP.AUTO-MCNC: 222 MG/DL (ref 70–110)
GLUCOSE BLD STRIP.AUTO-MCNC: 256 MG/DL (ref 70–110)
HCG SERPL QL: NEGATIVE

## 2022-11-17 PROCEDURE — 97116 GAIT TRAINING THERAPY: CPT

## 2022-11-17 PROCEDURE — 77030040361 HC SLV COMPR DVT MDII -B: Performed by: ORTHOPAEDIC SURGERY

## 2022-11-17 PROCEDURE — 77030018836 HC SOL IRR NACL ICUM -A: Performed by: ORTHOPAEDIC SURGERY

## 2022-11-17 PROCEDURE — 77030035236 HC SUT PDS STRATFX BARB J&J -B: Performed by: ORTHOPAEDIC SURGERY

## 2022-11-17 PROCEDURE — 74011250636 HC RX REV CODE- 250/636: Performed by: ORTHOPAEDIC SURGERY

## 2022-11-17 PROCEDURE — 74011000250 HC RX REV CODE- 250: Performed by: NURSE ANESTHETIST, CERTIFIED REGISTERED

## 2022-11-17 PROCEDURE — 74011250637 HC RX REV CODE- 250/637: Performed by: ORTHOPAEDIC SURGERY

## 2022-11-17 PROCEDURE — 0SRC0J9 REPLACEMENT OF RIGHT KNEE JOINT WITH SYNTHETIC SUBSTITUTE, CEMENTED, OPEN APPROACH: ICD-10-PCS | Performed by: ORTHOPAEDIC SURGERY

## 2022-11-17 PROCEDURE — 74011000250 HC RX REV CODE- 250: Performed by: ANESTHESIOLOGY

## 2022-11-17 PROCEDURE — C1713 ANCHOR/SCREW BN/BN,TIS/BN: HCPCS | Performed by: ORTHOPAEDIC SURGERY

## 2022-11-17 PROCEDURE — 77030014144 HC TY SPN ANES BBMI -B: Performed by: ANESTHESIOLOGY

## 2022-11-17 PROCEDURE — 51798 US URINE CAPACITY MEASURE: CPT

## 2022-11-17 PROCEDURE — 74011000250 HC RX REV CODE- 250: Performed by: ORTHOPAEDIC SURGERY

## 2022-11-17 PROCEDURE — 82962 GLUCOSE BLOOD TEST: CPT

## 2022-11-17 PROCEDURE — C1776 JOINT DEVICE (IMPLANTABLE): HCPCS | Performed by: ORTHOPAEDIC SURGERY

## 2022-11-17 PROCEDURE — 77030031139 HC SUT VCRL2 J&J -A: Performed by: ORTHOPAEDIC SURGERY

## 2022-11-17 PROCEDURE — 94762 N-INVAS EAR/PLS OXIMTRY CONT: CPT

## 2022-11-17 PROCEDURE — 77030019605: Performed by: ORTHOPAEDIC SURGERY

## 2022-11-17 PROCEDURE — 97161 PT EVAL LOW COMPLEX 20 MIN: CPT

## 2022-11-17 PROCEDURE — 77030006835 HC BLD SAW SAG STRY -B: Performed by: ORTHOPAEDIC SURGERY

## 2022-11-17 PROCEDURE — 77030016507 HC BIT DRL3 ZIMM -B: Performed by: ORTHOPAEDIC SURGERY

## 2022-11-17 PROCEDURE — 77030013708 HC HNDPC SUC IRR PULS STRY –B: Performed by: ORTHOPAEDIC SURGERY

## 2022-11-17 PROCEDURE — 74011250636 HC RX REV CODE- 250/636: Performed by: NURSE ANESTHETIST, CERTIFIED REGISTERED

## 2022-11-17 PROCEDURE — 77030013079 HC BLNKT BAIR HGGR 3M -A: Performed by: ANESTHESIOLOGY

## 2022-11-17 PROCEDURE — 74011636637 HC RX REV CODE- 636/637: Performed by: ORTHOPAEDIC SURGERY

## 2022-11-17 PROCEDURE — 84703 CHORIONIC GONADOTROPIN ASSAY: CPT

## 2022-11-17 PROCEDURE — 76010000131 HC OR TIME 2 TO 2.5 HR: Performed by: ORTHOPAEDIC SURGERY

## 2022-11-17 PROCEDURE — 76210000016 HC OR PH I REC 1 TO 1.5 HR: Performed by: ORTHOPAEDIC SURGERY

## 2022-11-17 PROCEDURE — 2709999900 HC NON-CHARGEABLE SUPPLY: Performed by: ORTHOPAEDIC SURGERY

## 2022-11-17 PROCEDURE — 77030040830 HC CATH URETH FOL MDII -A

## 2022-11-17 PROCEDURE — 76060000035 HC ANESTHESIA 2 TO 2.5 HR: Performed by: ORTHOPAEDIC SURGERY

## 2022-11-17 PROCEDURE — 77030025954 HC SUT QUIL PDO SSPC -B: Performed by: ORTHOPAEDIC SURGERY

## 2022-11-17 PROCEDURE — 73560 X-RAY EXAM OF KNEE 1 OR 2: CPT

## 2022-11-17 PROCEDURE — 01402 ANES OPN/ARTH TOT KNE ARTHRP: CPT | Performed by: NURSE ANESTHETIST, CERTIFIED REGISTERED

## 2022-11-17 PROCEDURE — 01402 ANES OPN/ARTH TOT KNE ARTHRP: CPT | Performed by: ANESTHESIOLOGY

## 2022-11-17 PROCEDURE — 77030002933 HC SUT MCRYL J&J -A: Performed by: ORTHOPAEDIC SURGERY

## 2022-11-17 PROCEDURE — 77030003029 HC SUT VCRL J&J -B: Performed by: ORTHOPAEDIC SURGERY

## 2022-11-17 PROCEDURE — 74011250637 HC RX REV CODE- 250/637: Performed by: NURSE ANESTHETIST, CERTIFIED REGISTERED

## 2022-11-17 PROCEDURE — 77030003665 HC NDL SPN BBMI -A: Performed by: ORTHOPAEDIC SURGERY

## 2022-11-17 PROCEDURE — 65270000029 HC RM PRIVATE

## 2022-11-17 PROCEDURE — 77030040922 HC BLNKT HYPOTHRM STRY -A: Performed by: ORTHOPAEDIC SURGERY

## 2022-11-17 PROCEDURE — 74011000258 HC RX REV CODE- 258: Performed by: NURSE ANESTHETIST, CERTIFIED REGISTERED

## 2022-11-17 PROCEDURE — 77030029372 HC ADH SKN CLSR PRINEO J&J -C: Performed by: ORTHOPAEDIC SURGERY

## 2022-11-17 DEVICE — IMPLANTABLE DEVICE: Type: IMPLANTABLE DEVICE | Site: KNEE | Status: FUNCTIONAL

## 2022-11-17 DEVICE — KNEE K1 TOT HEMI STD CEM IMPL CAPPED K1 ZIM: Type: IMPLANTABLE DEVICE | Site: KNEE | Status: FUNCTIONAL

## 2022-11-17 DEVICE — TIB PRN NP STM 5 DEG SZ ER -- PERSONA: Type: IMPLANTABLE DEVICE | Site: KNEE | Status: FUNCTIONAL

## 2022-11-17 DEVICE — CEMENT BNE FL 20ML MONMR 40GM -- SIMPLEX P: Type: IMPLANTABLE DEVICE | Site: KNEE | Status: FUNCTIONAL

## 2022-11-17 DEVICE — PSN MC VE ASF R 10MM 6-7/EF: Type: IMPLANTABLE DEVICE | Site: KNEE | Status: FUNCTIONAL

## 2022-11-17 RX ORDER — SODIUM CHLORIDE 0.9 % (FLUSH) 0.9 %
5-40 SYRINGE (ML) INJECTION EVERY 8 HOURS
Status: DISCONTINUED | OUTPATIENT
Start: 2022-11-17 | End: 2022-11-18 | Stop reason: HOSPADM

## 2022-11-17 RX ORDER — ACETAMINOPHEN 500 MG
1000 TABLET ORAL ONCE
Status: COMPLETED | OUTPATIENT
Start: 2022-11-17 | End: 2022-11-17

## 2022-11-17 RX ORDER — BUPIVACAINE HYDROCHLORIDE 7.5 MG/ML
INJECTION, SOLUTION INTRASPINAL
Status: COMPLETED | OUTPATIENT
Start: 2022-11-17 | End: 2022-11-17

## 2022-11-17 RX ORDER — SODIUM CHLORIDE 0.9 % (FLUSH) 0.9 %
5-40 SYRINGE (ML) INJECTION EVERY 8 HOURS
Status: DISCONTINUED | OUTPATIENT
Start: 2022-11-17 | End: 2022-11-17 | Stop reason: HOSPADM

## 2022-11-17 RX ORDER — ONDANSETRON 2 MG/ML
4 INJECTION INTRAMUSCULAR; INTRAVENOUS
Status: DISCONTINUED | OUTPATIENT
Start: 2022-11-17 | End: 2022-11-18 | Stop reason: HOSPADM

## 2022-11-17 RX ORDER — HYDROCORTISONE SODIUM SUCCINATE 100 MG/2ML
INJECTION, POWDER, FOR SOLUTION INTRAMUSCULAR; INTRAVENOUS AS NEEDED
Status: DISCONTINUED | OUTPATIENT
Start: 2022-11-17 | End: 2022-11-17 | Stop reason: HOSPADM

## 2022-11-17 RX ORDER — TRAMADOL HYDROCHLORIDE 50 MG/1
50 TABLET ORAL
Status: DISCONTINUED | OUTPATIENT
Start: 2022-11-17 | End: 2022-11-18 | Stop reason: HOSPADM

## 2022-11-17 RX ORDER — PROPOFOL 10 MG/ML
INJECTION, EMULSION INTRAVENOUS AS NEEDED
Status: DISCONTINUED | OUTPATIENT
Start: 2022-11-17 | End: 2022-11-17 | Stop reason: HOSPADM

## 2022-11-17 RX ORDER — KETOROLAC TROMETHAMINE 15 MG/ML
15 INJECTION, SOLUTION INTRAMUSCULAR; INTRAVENOUS
Status: DISCONTINUED | OUTPATIENT
Start: 2022-11-17 | End: 2022-11-18 | Stop reason: HOSPADM

## 2022-11-17 RX ORDER — IPRATROPIUM BROMIDE 0.5 MG/2.5ML
0.5 SOLUTION RESPIRATORY (INHALATION)
Status: DISCONTINUED | OUTPATIENT
Start: 2022-11-17 | End: 2022-11-18 | Stop reason: HOSPADM

## 2022-11-17 RX ORDER — INSULIN LISPRO 100 [IU]/ML
INJECTION, SOLUTION INTRAVENOUS; SUBCUTANEOUS ONCE
Status: DISCONTINUED | OUTPATIENT
Start: 2022-11-17 | End: 2022-11-17 | Stop reason: HOSPADM

## 2022-11-17 RX ORDER — APREPITANT 40 MG/1
40 CAPSULE ORAL ONCE
Status: COMPLETED | OUTPATIENT
Start: 2022-11-17 | End: 2022-11-17

## 2022-11-17 RX ORDER — SODIUM CHLORIDE, SODIUM LACTATE, POTASSIUM CHLORIDE, CALCIUM CHLORIDE 600; 310; 30; 20 MG/100ML; MG/100ML; MG/100ML; MG/100ML
25 INJECTION, SOLUTION INTRAVENOUS CONTINUOUS
Status: DISCONTINUED | OUTPATIENT
Start: 2022-11-17 | End: 2022-11-17 | Stop reason: HOSPADM

## 2022-11-17 RX ORDER — ASPIRIN 81 MG/1
81 TABLET ORAL 2 TIMES DAILY
Status: DISCONTINUED | OUTPATIENT
Start: 2022-11-17 | End: 2022-11-18 | Stop reason: HOSPADM

## 2022-11-17 RX ORDER — ONDANSETRON 2 MG/ML
4 INJECTION INTRAMUSCULAR; INTRAVENOUS ONCE
Status: COMPLETED | OUTPATIENT
Start: 2022-11-17 | End: 2022-11-17

## 2022-11-17 RX ORDER — NALOXONE HYDROCHLORIDE 0.4 MG/ML
0.4 INJECTION, SOLUTION INTRAMUSCULAR; INTRAVENOUS; SUBCUTANEOUS AS NEEDED
Status: DISCONTINUED | OUTPATIENT
Start: 2022-11-17 | End: 2022-11-18 | Stop reason: HOSPADM

## 2022-11-17 RX ORDER — ZOLPIDEM TARTRATE 5 MG/1
5 TABLET ORAL
Status: DISCONTINUED | OUTPATIENT
Start: 2022-11-17 | End: 2022-11-18 | Stop reason: HOSPADM

## 2022-11-17 RX ORDER — BUDESONIDE 0.5 MG/2ML
500 INHALANT ORAL
Status: DISCONTINUED | OUTPATIENT
Start: 2022-11-17 | End: 2022-11-18 | Stop reason: HOSPADM

## 2022-11-17 RX ORDER — PREGABALIN 75 MG/1
75 CAPSULE ORAL ONCE
Status: COMPLETED | OUTPATIENT
Start: 2022-11-17 | End: 2022-11-17

## 2022-11-17 RX ORDER — DIPHENHYDRAMINE HCL 25 MG
25 CAPSULE ORAL
Status: DISCONTINUED | OUTPATIENT
Start: 2022-11-17 | End: 2022-11-18 | Stop reason: HOSPADM

## 2022-11-17 RX ORDER — ARFORMOTEROL TARTRATE 15 UG/2ML
15 SOLUTION RESPIRATORY (INHALATION)
Status: DISCONTINUED | OUTPATIENT
Start: 2022-11-17 | End: 2022-11-18 | Stop reason: HOSPADM

## 2022-11-17 RX ORDER — FENTANYL CITRATE 50 UG/ML
25 INJECTION, SOLUTION INTRAMUSCULAR; INTRAVENOUS
Status: DISCONTINUED | OUTPATIENT
Start: 2022-11-17 | End: 2022-11-17 | Stop reason: HOSPADM

## 2022-11-17 RX ORDER — FAMOTIDINE 20 MG/1
20 TABLET, FILM COATED ORAL ONCE
Status: COMPLETED | OUTPATIENT
Start: 2022-11-17 | End: 2022-11-17

## 2022-11-17 RX ORDER — LIDOCAINE HYDROCHLORIDE 20 MG/ML
INJECTION, SOLUTION EPIDURAL; INFILTRATION; INTRACAUDAL; PERINEURAL AS NEEDED
Status: DISCONTINUED | OUTPATIENT
Start: 2022-11-17 | End: 2022-11-17 | Stop reason: HOSPADM

## 2022-11-17 RX ORDER — DEXAMETHASONE SODIUM PHOSPHATE 4 MG/ML
INJECTION, SOLUTION INTRA-ARTICULAR; INTRALESIONAL; INTRAMUSCULAR; INTRAVENOUS; SOFT TISSUE AS NEEDED
Status: DISCONTINUED | OUTPATIENT
Start: 2022-11-17 | End: 2022-11-17 | Stop reason: HOSPADM

## 2022-11-17 RX ORDER — SERTRALINE HYDROCHLORIDE 50 MG/1
50 TABLET, FILM COATED ORAL DAILY
Status: DISCONTINUED | OUTPATIENT
Start: 2022-11-18 | End: 2022-11-18 | Stop reason: HOSPADM

## 2022-11-17 RX ORDER — SODIUM CHLORIDE 0.9 % (FLUSH) 0.9 %
5-40 SYRINGE (ML) INJECTION AS NEEDED
Status: DISCONTINUED | OUTPATIENT
Start: 2022-11-17 | End: 2022-11-18 | Stop reason: HOSPADM

## 2022-11-17 RX ORDER — PROPOFOL 10 MG/ML
VIAL (ML) INTRAVENOUS
Status: DISCONTINUED | OUTPATIENT
Start: 2022-11-17 | End: 2022-11-17 | Stop reason: HOSPADM

## 2022-11-17 RX ORDER — MELOXICAM 7.5 MG/1
15 TABLET ORAL ONCE
Status: COMPLETED | OUTPATIENT
Start: 2022-11-17 | End: 2022-11-17

## 2022-11-17 RX ORDER — SODIUM CHLORIDE 0.9 % (FLUSH) 0.9 %
5-40 SYRINGE (ML) INJECTION AS NEEDED
Status: DISCONTINUED | OUTPATIENT
Start: 2022-11-17 | End: 2022-11-17 | Stop reason: HOSPADM

## 2022-11-17 RX ORDER — AMOXICILLIN 250 MG
1 CAPSULE ORAL 2 TIMES DAILY
Status: DISCONTINUED | OUTPATIENT
Start: 2022-11-17 | End: 2022-11-18 | Stop reason: HOSPADM

## 2022-11-17 RX ORDER — OXYCODONE HYDROCHLORIDE 5 MG/1
5 TABLET ORAL
Status: DISCONTINUED | OUTPATIENT
Start: 2022-11-17 | End: 2022-11-18 | Stop reason: HOSPADM

## 2022-11-17 RX ORDER — DEXAMETHASONE SODIUM PHOSPHATE 10 MG/ML
10 INJECTION INTRAMUSCULAR; INTRAVENOUS ONCE
Status: DISCONTINUED | OUTPATIENT
Start: 2022-11-18 | End: 2022-11-18

## 2022-11-17 RX ORDER — LIDOCAINE HYDROCHLORIDE 10 MG/ML
0.1 INJECTION, SOLUTION EPIDURAL; INFILTRATION; INTRACAUDAL; PERINEURAL AS NEEDED
Status: DISCONTINUED | OUTPATIENT
Start: 2022-11-17 | End: 2022-11-17 | Stop reason: HOSPADM

## 2022-11-17 RX ORDER — POLYETHYLENE GLYCOL 3350 17 G/17G
17 POWDER, FOR SOLUTION ORAL DAILY
Status: DISCONTINUED | OUTPATIENT
Start: 2022-11-18 | End: 2022-11-18 | Stop reason: HOSPADM

## 2022-11-17 RX ORDER — HYDROMORPHONE HYDROCHLORIDE 1 MG/ML
0.5 INJECTION, SOLUTION INTRAMUSCULAR; INTRAVENOUS; SUBCUTANEOUS
Status: DISCONTINUED | OUTPATIENT
Start: 2022-11-17 | End: 2022-11-18 | Stop reason: HOSPADM

## 2022-11-17 RX ORDER — IPRATROPIUM BROMIDE AND ALBUTEROL SULFATE 2.5; .5 MG/3ML; MG/3ML
3 SOLUTION RESPIRATORY (INHALATION)
Status: DISCONTINUED | OUTPATIENT
Start: 2022-11-17 | End: 2022-11-18 | Stop reason: HOSPADM

## 2022-11-17 RX ORDER — MIDAZOLAM HYDROCHLORIDE 1 MG/ML
INJECTION, SOLUTION INTRAMUSCULAR; INTRAVENOUS AS NEEDED
Status: DISCONTINUED | OUTPATIENT
Start: 2022-11-17 | End: 2022-11-17 | Stop reason: HOSPADM

## 2022-11-17 RX ORDER — MONTELUKAST SODIUM 10 MG/1
10 TABLET ORAL DAILY
Status: DISCONTINUED | OUTPATIENT
Start: 2022-11-18 | End: 2022-11-18 | Stop reason: HOSPADM

## 2022-11-17 RX ORDER — ONDANSETRON 2 MG/ML
INJECTION INTRAMUSCULAR; INTRAVENOUS AS NEEDED
Status: DISCONTINUED | OUTPATIENT
Start: 2022-11-17 | End: 2022-11-17 | Stop reason: HOSPADM

## 2022-11-17 RX ORDER — INSULIN LISPRO 100 [IU]/ML
INJECTION, SOLUTION INTRAVENOUS; SUBCUTANEOUS
Status: DISCONTINUED | OUTPATIENT
Start: 2022-11-17 | End: 2022-11-18 | Stop reason: HOSPADM

## 2022-11-17 RX ORDER — LANOLIN ALCOHOL/MO/W.PET/CERES
3 CREAM (GRAM) TOPICAL
Status: DISCONTINUED | OUTPATIENT
Start: 2022-11-17 | End: 2022-11-18 | Stop reason: HOSPADM

## 2022-11-17 RX ORDER — VANCOMYCIN HYDROCHLORIDE 1 G/20ML
INJECTION, POWDER, LYOPHILIZED, FOR SOLUTION INTRAVENOUS AS NEEDED
Status: DISCONTINUED | OUTPATIENT
Start: 2022-11-17 | End: 2022-11-17 | Stop reason: HOSPADM

## 2022-11-17 RX ORDER — MAGNESIUM SULFATE 100 %
16 CRYSTALS MISCELLANEOUS AS NEEDED
Status: DISCONTINUED | OUTPATIENT
Start: 2022-11-17 | End: 2022-11-18 | Stop reason: HOSPADM

## 2022-11-17 RX ORDER — PANTOPRAZOLE SODIUM 40 MG/1
40 TABLET, DELAYED RELEASE ORAL
Status: DISCONTINUED | OUTPATIENT
Start: 2022-11-18 | End: 2022-11-18 | Stop reason: HOSPADM

## 2022-11-17 RX ORDER — FACIAL-BODY WIPES
10 EACH TOPICAL DAILY PRN
Status: DISCONTINUED | OUTPATIENT
Start: 2022-11-17 | End: 2022-11-18 | Stop reason: HOSPADM

## 2022-11-17 RX ORDER — ACETAMINOPHEN 500 MG
1000 TABLET ORAL EVERY 8 HOURS
Status: DISCONTINUED | OUTPATIENT
Start: 2022-11-17 | End: 2022-11-18 | Stop reason: HOSPADM

## 2022-11-17 RX ADMIN — KETOROLAC TROMETHAMINE 15 MG: 15 INJECTION, SOLUTION INTRAMUSCULAR; INTRAVENOUS at 23:58

## 2022-11-17 RX ADMIN — ARFORMOTEROL TARTRATE 15 MCG: 15 SOLUTION RESPIRATORY (INHALATION) at 20:29

## 2022-11-17 RX ADMIN — ACETAMINOPHEN 1000 MG: 500 TABLET ORAL at 21:41

## 2022-11-17 RX ADMIN — SENNOSIDES AND DOCUSATE SODIUM 1 TABLET: 50; 8.6 TABLET ORAL at 17:17

## 2022-11-17 RX ADMIN — KETOROLAC TROMETHAMINE 15 MG: 15 INJECTION, SOLUTION INTRAMUSCULAR; INTRAVENOUS at 16:38

## 2022-11-17 RX ADMIN — SODIUM CHLORIDE, PRESERVATIVE FREE 10 ML: 5 INJECTION INTRAVENOUS at 17:00

## 2022-11-17 RX ADMIN — HYDROMORPHONE HYDROCHLORIDE 0.5 MG: 1 INJECTION, SOLUTION INTRAMUSCULAR; INTRAVENOUS; SUBCUTANEOUS at 21:42

## 2022-11-17 RX ADMIN — PROPOFOL 75 MCG/KG/MIN: 10 INJECTION, EMULSION INTRAVENOUS at 12:00

## 2022-11-17 RX ADMIN — ONDANSETRON 4 MG: 2 INJECTION INTRAMUSCULAR; INTRAVENOUS at 11:41

## 2022-11-17 RX ADMIN — SODIUM CHLORIDE, PRESERVATIVE FREE 10 ML: 5 INJECTION INTRAVENOUS at 22:00

## 2022-11-17 RX ADMIN — PREGABALIN 75 MG: 75 CAPSULE ORAL at 10:12

## 2022-11-17 RX ADMIN — APREPITANT 40 MG: 40 CAPSULE ORAL at 10:12

## 2022-11-17 RX ADMIN — TRAMADOL HYDROCHLORIDE 50 MG: 50 TABLET, COATED ORAL at 23:24

## 2022-11-17 RX ADMIN — MIDAZOLAM 1 MG: 1 INJECTION INTRAMUSCULAR; INTRAVENOUS at 11:45

## 2022-11-17 RX ADMIN — LIDOCAINE HYDROCHLORIDE 20 MG: 20 INJECTION, SOLUTION EPIDURAL; INFILTRATION; INTRACAUDAL; PERINEURAL at 12:00

## 2022-11-17 RX ADMIN — HYDROCORTISONE SODIUM SUCCINATE 50 MG: 100 INJECTION, POWDER, FOR SOLUTION INTRAMUSCULAR; INTRAVENOUS at 11:52

## 2022-11-17 RX ADMIN — MIDAZOLAM 1 MG: 1 INJECTION INTRAMUSCULAR; INTRAVENOUS at 12:03

## 2022-11-17 RX ADMIN — SODIUM CHLORIDE 1 G: 9 INJECTION, SOLUTION INTRAVENOUS at 13:38

## 2022-11-17 RX ADMIN — ACETAMINOPHEN 1000 MG: 500 TABLET ORAL at 16:37

## 2022-11-17 RX ADMIN — BUDESONIDE 500 MCG: 0.5 SUSPENSION RESPIRATORY (INHALATION) at 20:38

## 2022-11-17 RX ADMIN — MELOXICAM 15 MG: 7.5 TABLET ORAL at 10:12

## 2022-11-17 RX ADMIN — CEFAZOLIN SODIUM 2 G: 1 INJECTION, POWDER, FOR SOLUTION INTRAMUSCULAR; INTRAVENOUS at 12:01

## 2022-11-17 RX ADMIN — Medication 6 UNITS: at 21:54

## 2022-11-17 RX ADMIN — ASPIRIN 81 MG: 81 TABLET, COATED ORAL at 20:27

## 2022-11-17 RX ADMIN — ONDANSETRON 4 MG: 2 INJECTION INTRAMUSCULAR; INTRAVENOUS at 15:05

## 2022-11-17 RX ADMIN — WATER 2 G: 1 INJECTION INTRAMUSCULAR; INTRAVENOUS; SUBCUTANEOUS at 20:32

## 2022-11-17 RX ADMIN — FENTANYL CITRATE 25 MCG: 50 INJECTION INTRAMUSCULAR; INTRAVENOUS at 15:00

## 2022-11-17 RX ADMIN — SODIUM CHLORIDE 1 G: 9 INJECTION, SOLUTION INTRAVENOUS at 12:13

## 2022-11-17 RX ADMIN — ACETAMINOPHEN 1000 MG: 500 TABLET ORAL at 10:12

## 2022-11-17 RX ADMIN — SODIUM CHLORIDE, SODIUM LACTATE, POTASSIUM CHLORIDE, AND CALCIUM CHLORIDE: 600; 310; 30; 20 INJECTION, SOLUTION INTRAVENOUS at 11:41

## 2022-11-17 RX ADMIN — PROPOFOL 50 MG: 10 INJECTION, EMULSION INTRAVENOUS at 12:00

## 2022-11-17 RX ADMIN — TRAMADOL HYDROCHLORIDE 50 MG: 50 TABLET, COATED ORAL at 17:23

## 2022-11-17 RX ADMIN — OXYCODONE HYDROCHLORIDE 5 MG: 5 TABLET ORAL at 20:27

## 2022-11-17 RX ADMIN — FAMOTIDINE 20 MG: 20 TABLET ORAL at 10:12

## 2022-11-17 RX ADMIN — BUPIVACAINE HYDROCHLORIDE 10 MG: 7.5 INJECTION, SOLUTION SUBARACHNOID at 11:48

## 2022-11-17 RX ADMIN — DEXAMETHASONE SODIUM PHOSPHATE 10 MG: 4 INJECTION, SOLUTION INTRAMUSCULAR; INTRAVENOUS at 11:58

## 2022-11-17 RX ADMIN — Medication 4 UNITS: at 17:32

## 2022-11-17 NOTE — OP NOTES
Operative Note    Patient: Adamaris Leger  YOB: 1970  MRN: [id number]    Date of Procedure: 11/17/2022     Pre-Op Diagnosis: Unilateral primary osteoarthritis, right knee [M17.11]    Post-Op Diagnosis: Same as preoperative diagnosis. Procedure(s):  RIGHT TOTAL KNEE ARTHROPLASTY/MARY/ERAS-TJA    Surgeon(s):  Herson Landaverde MD    Surgical Assistant: Surg Asst-1: Osmel Driscoll    Anesthesia: Spinal     Estimated Blood Loss (mL):  673    Complications: None    Specimens: * No specimens in log *     Implants:   Implant Name Type Inv. Item Serial No.  Lot No. LRB No. Used Action   CEMENT BNE 40 GM RADIOPAQUE BA SIMPLEX P - [id number]  CEMENT BNE 40 GM RADIOPAQUE BA SIMPLEX P  [employer] [facility]_WD [id number] Right 2 Implanted   TIB PRN NP STM 5 DEG SZ ER -- PERSONA - [id number]  TIB PRN NP STM 5 DEG SZ ER -- PERSONA  MARY [facility]_WD [id number] Right 1 Implanted   COMPONENT FEM SZ 7 SUSY R KNEE CO CHROM PAM CRUCE RET COR - [id number]  COMPONENT FEM SZ 7 SUSY R KNEE CO CHROM PAM CRUCE RET COR  MARY [employer]_WD [id number] Right 1 Implanted   PSN MC VE ASF R 10MM 6-7/EF - [id number]  PSN MC VE ASF R 10MM 6-7/EF  MARY [employer]_WD [id number] Right 1 Implanted       Drains: * No LDAs found *    Findings: end stage osteoarthritis     Detailed Description of Procedure:        INDICATIONS FOR PROCEDURE:  The patient has failed conservative management of their knee arthritis to include activity modification, oral medication, and injections. The disease has caused significant impact on their quality of life and wished to undergo surgical management understanding the risk of the surgery and the benefit to relieve pain and restore function following failure of conservative management. PROCEDURE:  After informed consent was confirmed in the preoperative holding area, the operative extremity was marked.  The patient was brought to the operating room and anesthesia was initiated. All bony prominences were padded and the non-operative limb had mechanical deep vein thrombosis (DVT) prophylaxis placed. The operative leg was prepped and draped in the usual sterile fashion. A time-out  was done to ensure the right patient, side, and procedure to be performed. The patient received weight based preoperative antibiotics and received Tranexamic Acid. A standard medial parapatellar approach to the knee was performed. There were significant degenerative changes indicating a total knee arthroplasty. The distal femur was cut at 5 degrees of valgus using intramedullary alignment. The posterior referenced 3 degree external rotation sizer allowed us to use the 4-1 cutting block to complete the femoral cuts in appropriate external rotation to allow for appropriate patellar tracking. Care was taken not to notch the femur. The tibia was exposed and subluxed anteriorly after releasing the ACL and PCL. Extramedullary alignment was used to guide a mechanically alignment tibial resection with minimal slope. The meniscal remnants, remaining ACL/PCL fibers, and posterior osteophytes were then removed and the posterior capsule was gently elevated. Local infiltrative analgesia was injected into the surrounding tissues. Spacer blocks allowed for preliminary ligamentous balancing. Trial components were placed and final balancing was confirmed. The patella was assessed and was not resurfaced. A lateral facetectomy was performed. Synovium around the patella was removed and the patella was de-enervated. The tibia was sized and finished in appropriate external rotation. The bony surfaces were cleaned and dried and the components were place. Excess cement was removed. Trialing confirmed the chosen polyethylene size and it was placed. A betadine wash and irrigation was completed. The arthrotomy was repaired with a combination of 0 vicryl and #1 quill suture.  The deep fascial layer was approximated with a running stratifix suture. The skin dermis was closed with 2-0 monocryl suture followed by a running 3-0 monocryl stitch in the subcuticular layer. Prineo and a Mepilex were applied in deep flexion. At the end  of the procedure, all counts were correct times two. The patient was transferred to PACU in stable condition. There were no immediate complications. Post operatively the patient will be weight bearing as tolerated. The patient will attempt to walk on post-operative day 0 and will have DVT chemoprophylaxis along with early mobilization and mechanical prophylaxis while in house. The patient will remain on our postoperative pain regimen to minimize heavy narcotics. I was present and scrubbed for all key portions of the procedure.      Electronically Signed Up   Dwight Phalen, MD  11/17/2022  1:47 PM                      Electronically Signed by Dwight Phalen, MD on 11/17/2022 at 1:46 PM

## 2022-11-17 NOTE — INTERVAL H&P NOTE
Update History & Physical    The Patient's History and Physical of October 19, 2022 was reviewed with the patient and I examined the patient. There was no change. The surgical site was confirmed by the patient and me. Plan:  The risk, benefits, expected outcome, and alternative to the recommended procedure have been discussed with the patient. Patient understands and wants to proceed with the procedure.     Electronically signed by Annalisa Cabrera MD on 11/17/2022 at 10:28 AM

## 2022-11-17 NOTE — PROGRESS NOTES
Problem: Mobility Impaired (Adult and Pediatric)  Goal: *Acute Goals and Plan of Care (Insert Text)  Description: Physical Therapy Goals  Initiated 11/17/2022 and to be accomplished within 7 day(s)  1. Patient will move from supine to sit and sit to supine , scoot up and down, and roll side to side in bed with modified independence. 2.  Patient will transfer from bed to chair and chair to bed with modified independence using the least restrictive device. 3.  Patient will perform sit to stand with modified independence. 4.  Patient will ambulate with modified independence for 200 feet with the least restrictive device. 5.  Patient will ascend/descend 1 stairs with 0 handrail(s) with modified independence. PLOF: Pt reporting she lives in 1 story house with 1 WERO with , independent at home, uses [facility] intermittently with increased pain. Pt is staying at friends house for 1-2 nights, friend's house set up is apartment with elevator access. Outcome: Progressing Towards Goal     PHYSICAL THERAPY EVALUATION    Patient: Shirley Weston (55 y.o. female)  Date: 11/17/2022  Primary Diagnosis: Unilateral primary osteoarthritis, right knee [M17.11]  Osteoarthritis of right knee [M17.11]  Procedure(s) (LRB):  RIGHT TOTAL KNEE ARTHROPLASTY/MARY/ERAS-TJA (Right) Day of Surgery   Precautions:  Fall, WBAT (RLE)    ASSESSMENT :  Pt cleared to participate in PT session, pt received semi-reclined in bed and agreeable to therapy session. Based on the objective data described below, the patient presents with decreased endurance, decreased strength, decreased balance reactions, gait deviations, increased pain, and decreased independence in functional mobility. Patient is 48 yo female admitted to hospital for TKA. Patient was educated on weight bearing status, role of therapy, TE (see below), and equipment in room including role of SCDs, towel roll, and ice sleeve.  Patient demonstrated good SLR and transferred to sitting EOB for objective assessment with Chuckie for RLE management. Patient was given demo with instruction on sit <> stand transfer and gait training. Patient transferred to standing with Chuckie and ambulated x10 feet with Chuckie and RW to toilet. Pt sitting with good balance, unable to void but reporting pressure through lower abdomen. Pt standing from toilet with Chuckie, ambulating back to recliner with Chuckie and slow gait speed. At conclusion of session patient transferred to sitting in recliner and was left resting with call bell by the side, and towel roll under ankle. Pt positioned for comfort and educated to call for assist before getting up, pt verbalized understanding. Pt left with all needs met and call bell in reach. RN notified of position and participation. Pt not yet safe for discharge home, will need further ambulation and stair training prior to discharge. Patient will benefit from skilled intervention to address the above impairments.   Patient's rehabilitation potential is considered to be Good  Factors which may influence rehabilitation potential include:   [x]         None noted  []         Mental ability/status  []         Medical condition  []         Home/family situation and support systems  []         Safety awareness  []         Pain tolerance/management  []         Other:      PLAN :  Recommendations and Planned Interventions:   [x]           Bed Mobility Training             []    Neuromuscular Re-Education  [x]           Transfer Training                   []    Orthotic/Prosthetic Training  [x]           Gait Training                          []    Modalities  [x]           Therapeutic Exercises           []    Edema Management/Control  [x]           Therapeutic Activities            [x]    Family Training/Education  [x]           Patient Education  []           Other (comment):    Frequency/Duration: Patient will be followed by physical therapy 1-2 times per day/4-7 days per week to address goals. Further Equipment Recommendations for Discharge: rolling walker    AMPAC: At this time and based on an AM-PAC score of 18/24. Recommend patient returns to prior setting with prior services. Outpatient PT as recommended by physician. This AMPAC score should be considered in conjunction with interdisciplinary team recommendations to determine the most appropriate discharge setting. Patient's social support, diagnosis, medical stability, and prior level of function should also be taken into consideration. SUBJECTIVE:   Patient stated My legs feel numb. 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     Past Surgical History:   Procedure Laterality Date    HX BREAST REDUCTION  2019    HX  SECTION  2002    triplets    HX GYN  2019    uterine ablation and TOT sling    HX HERNIA REPAIR  0804    umbilical hernia, tummy tuck    HX OTHER SURGICAL      selective laser photocoagulation of communicating vessels    HX TONSILLECTOMY      HX UROLOGICAL  2020    removal of TOT sling    HX UROLOGICAL  2021    removal of scar tissue, bladder prolapse correction    HX WISDOM TEETH EXTRACTION       Barriers to Learning/Limitations: None  Compensate with: N/A  Home Situation:  Home Situation  Home Environment: Apartment (friend's home)  # Steps to Enter: 0  One/Two Story Residence: Other (Comment) (elevator)  Living Alone: No  Support Systems: Spouse/Significant Other  Patient Expects to be Discharged to[de-identified] Other: (friend's home)  Current DME Used/Available at Home: Shower chair, [address], straight  Critical Behavior:  Neurologic State: Drowsy  Orientation Level: Oriented X4  Cognition: Appropriate decision making  Safety/Judgement: Awareness of environment; Fall prevention  Psychosocial  Patient Behaviors: Calm; Cooperative  Family  Behaviors: Supportive  Skin Condition/Temp: Warm  Family  Behaviors: Supportive  Skin Integrity: Incision (comment) (Ace-wrap clean, dry and intact.)  Skin Integumentary  Skin Color: Appropriate for ethnicity  Skin Condition/Temp: Warm  Skin Integrity: Incision (comment) (Ace-wrap clean, dry and intact.)     Strength:    Strength: Generally decreased, functional    Tone & Sensation:   Tone: Normal    Sensation: Intact    Range Of Motion:  AROM: Within functional limits (LLE, RLE 0-90)    Posture:  Posture (WDL): Within defined limits     Functional Mobility:  Bed Mobility:     Supine to Sit: Minimum assistance (RLE management)    Transfers:  Sit to Stand: Minimum assistance  Stand to Sit: Contact guard assistance    Balance:   Sitting: Intact  Standing: Impaired; With support  Standing - Static: Good  Standing - Dynamic : Fair    Ambulation/Gait Training:  Distance (ft): 10 Feet (ft) (x2 reps)  Assistive Device: Walker, rolling  Ambulation - Level of Assistance: Minimal assistance     Gait Description (WDL): Exceptions to WDL  Gait Abnormalities: Decreased step clearance  Right Side Weight Bearing: As tolerated     Base of Support: Center of gravity altered;Shift to left     Speed/Danielle: Slow;Shuffled  Step Length: Right shortened;Left shortened    Pain:  Pain level pre-treatment: 5/10   Pain level post-treatment: 5/10   Pain Intervention(s) : Medication (see MAR); Rest, Ice, Repositioning  Response to intervention: Nurse notified, See doc flow    Activity Tolerance:   Good     Please refer to the flowsheet for vital signs taken during this treatment.   After treatment:   [x]         Patient left in no apparent distress sitting up in chair  []         Patient left in no apparent distress in bed  [x]         Call bell left within reach  [x]         Nursing notified  []         Caregiver present  []         Bed alarm activated  []         SCDs applied    COMMUNICATION/EDUCATION:   [x]         Role of Physical Therapy in the acute care setting. [x]         Fall prevention education was provided and the patient/caregiver indicated understanding. [x]         Patient/family have participated as able in goal setting and plan of care. [x]         Patient/family agree to work toward stated goals and plan of care. []         Patient understands intent and goals of therapy, but is neutral about his/her participation. []         Patient is unable to participate in goal setting/plan of care: ongoing with therapy staff.  []         Other: Thank you for this referral.  Otto Irvin, PT   Time Calculation: 25 mins      Eval Complexity: History: MEDIUM  Complexity : 1-2 comorbidities / personal factors will impact the outcome/ POC Exam:LOW Complexity : 1-2 Standardized tests and measures addressing body structure, function, activity limitation and / or participation in recreation  Presentation: LOW Complexity : Stable, uncomplicated  Clinical Decision Making:Low Complexity low  Overall Complexity:LOW     [employer] AM-PAC® Basic Mobility Inpatient Short Form (6-Clicks) Version 2    How much HELP from another person does the patient currently need    (If the patient hasn't done an activity recently, how much help from another person do you think he/she would need if he/she tried?)   Total (Total A or Dep)   A Lot  (Mod to Max A)   A Little (Sup or Min A)   None (Mod I to I)   Turning from your back to your side while in a flat bed without using bedrails? [] 1 [] 2 [x] 3 [] 4   2. Moving from lying on your back to sitting on the side of a flat bed without using bedrails? [] 1 [] 2 [x] 3 [] 4   3.  Moving to and from a bed to a chair (including a wheelchair)? [] 1 [] 2 [x] 3 [] 4   4. Standing up from a chair using your arms (e.g., wheelchair, or bedside chair)? [] 1 [] 2 [x] 3 [] 4   5. Walking in hospital room? [] 1 [] 2 [x] 3 [] 4   6. Climbing 3-5 steps with a railing?+   [] 1 [] 2 [x] 3 [] 4   +If stair climbing cannot be assessed, skip item #6. Sum responses from items 1-5.

## 2022-11-17 NOTE — DISCHARGE INSTRUCTIONS
OUTPATIENT MEDICATIONS    Tylenol (Acetaminophen) 500 mg: Take 2 tabs by mouth every 8 hours for pain. Mobic (Meloxicam) 15 mg: Take 1 cap by mouth once daily with food in the evening. Ultram (Tramadol) 50 mg: Take 1 tab by mouth every 6 hours for pain not controlled by Tylenol. Oxycodone (Roxicodone) 5 mg: Take 1 tab by mouth every 4 hours as needed for pain not controlled by acetaminophen and Ultram (Tramadol)     Surfak (Docusate calcium) 240 mg: Take 1 cap by mouth twice daily while taking narcotics to avoid constipation. Miralax (Polyethylene glycol): Mix 17 Grams with 8 oz. juice or water and take by mouth up to twice daily as needed for constipation. Pantoprazole (Protonix) 20 mg: Take 1 tablet daily while taking aspirin to prevent stomach ulcers. Aspirin EC 81 mg: Take 1 tablet by mouth twice daily for 6 weeks to prevent blood clots. Naloxone 4mg Nasal Spray: 1 spray in each nostril every 2-3 minutes as needed for narcotic overdose. DISCHARGE ACTIVITY  ? You may put full weight on your knee. Use crutches or a walker for a minimum of 2 weeks to prevent falls. After you feel comfortable (everyone is different), slowly transition to a cane and then nothing. This can take anywhere from a few weeks to a few months. No running or high impact activities. ? Avoid ANY kneeling for 3 months. THERAPY AFTER A KNEE REPLACEMENT  ? Therapy is important after surgery. You must do the therapy range of motion and exercises several times a day! Walking is the most important exercise and cannot be stressed enough. You should increase the frequency and duration of your walks as you feel stronger. ? Ankle pumps: 3 sets of 10-15    Work on straightening the knee:  While lying flat on your back, drive your knee down into the bed to straighten it. Hold for 15 seconds. Repeat 10 times for 3-5 sets. While sitting in a chair, lift your leg straight and let it down slowly.  Repeat 10 times for 3 sets. ? Prop the back of your foot on an ottoman or footstool and allow gravity to straighten it. You may place ice on the knee or gently push on your thigh to supplement gravity. You may do this as much as tolerated. You should feel a stretch in the back of your knee. Work on bending the knee:  While in a chair with your knee bent, slowly move your bottom forward to get more bend. Hold for 15 seconds and see if you can keep moving forward to bend. You want to see your knee form a right angle (90 degrees) by 4 weeks after surgery. ? While lying in bed, slowly move your heel towards your bottom. If you can, you may grab your shin to gently assist. This should not cause pain, but a stretch is OK. Be sure to ice several times a day to help decrease inflammation in the knee which is normal for several months after a knee replacement. BLOOD CLOT PREVENTION    You will be on a medication to prevent blood clots for 4-6 weeks after surgery. Take as directed until all tablets are taken. ?   Walking regularly every day helps decrease the risk of blood clots! COMPRESSION STOCKINGS     Wear knee-high compression stockings during the day as needed until swelling resolves. Remove stockings at bedtime. You may use an ace wrap instead of stockings to help with swelling. Apply the ace wrap from middle of calf to middle of thigh. WOUND DRESSING INSTRUCTIONS    Please do not remove the bandage. Keep in place until follow-up appointment 10-14 days after surgery. Call our office or email Chastity Nicholas RN if there is new drainage 5 days after surgery. SHOWERING  ? It is OK to shower ONLY if incision remains dry. ?   You may shower with the outer dressing in place, immediately following surgery. Make sure the edges are sealed to avoid getting the wound wet. If becomes wet under bandage, remove and call the [facility].     After bandage is removed (at 2 week postoperative visit), you may shower with Prineo mesh in place if no wound drainage. ?   Do not wash or scrub the incision for 6 weeks. ?   Do not apply topical antibiotics, creams, lotions, ointments, etc. to the incision for 6 weeks post-op. Do not submerge in water (pool/tub/bath/ocean/lake/etc.) until 4-6 weeks after surgery or until the incision has healed with no scabs remaining. ICE  ? Inflammation in the operative leg is normal for several months after surgery  ? Use ice several times a day for 30 minutes at a time if ice pack or continuously if using the Ice circulating machine. Do not apply heat to the wound. PROCEDURES    Any elective dental cleaning/procedure or invasive procedures like a colonoscopy should not be performed within 6 months following a joint replacement. ?   You must take antibiotic prophylaxis before any dental cleaning or other invasive procedure. DRIVING  ? Necessary travel only for 6 weeks. ?   You should not drive until you are able to walk WITHOUT a walker (a cane is OK) and are not taking any narcotic pain medications. This is usually around 3-4 weeks. ? Most patients do not require a handicapped parking pass. If necessary, we will provide one for a maximum duration of 3 months. QUESTIONS/CONCERNS    Business Hours: Contact Ms. Crisostomo, Monday through Thursday, at [phone] or email  [email or web address]. After Hours/Weekends/Holidays: Call the [facility] Boogie Fraser at [phone] and request to speak to the Orthopedic Surgery resident on-call or call the Orthopedic inpatient cruz at [phone]. Medication Requests: Call or email Ms. Crisostomo, Monday through Thursday, at [phone] or [email or web address] at least (2) business days in advance of running out of medication and indicate your preferred [employer]. POSTOPERATIVE APPOINTMENT    We will monitor your progress with follow-up clinic visits at approximately 2 and 6 weeks following your surgery.   ? Follow-up at [facility] Replacement as scheduled previously. Phone: [phone]. DISCHARGE SUMMARY from Nurse    PATIENT INSTRUCTIONS:    After general anesthesia or intravenous sedation, for 24 hours or while taking prescription Narcotics:  Limit your activities  Do not drive and operate hazardous machinery  Do not make important personal or business decisions  Do  not drink alcoholic beverages  If you have not urinated within 8 hours after discharge, please contact your surgeon on call. Report the following to your surgeon:  Excessive pain, swelling, redness or odor of or around the surgical area  Temperature over 100.5  Nausea and vomiting lasting longer than 4 hours or if unable to take medications  Any signs of decreased circulation or nerve impairment to extremity: change in color, persistent  numbness, tingling, coldness or increase pain  Any questions    What to do at Home:  Recommended activity: Activity as tolerated,     If you experience any of the following symptoms Refer to post surgical instructions, please follow up with Yvette Riley. *  Please give a list of your current medications to your Primary Care Provider. *  Please update this list whenever your medications are discontinued, doses are      changed, or new medications (including over-the-counter products) are added. *  Please carry medication information at all times in case of emergency situations. These are general instructions for a healthy lifestyle:    No smoking/ No tobacco products/ Avoid exposure to second hand smoke  Surgeon General's Warning:  Quitting smoking now greatly reduces serious risk to your health.     Obesity, smoking, and sedentary lifestyle greatly increases your risk for illness    A healthy diet, regular physical exercise & weight monitoring are important for maintaining a healthy lifestyle    You may be retaining fluid if you have a history of heart failure or if you experience any of the following symptoms:  Weight gain of 3 pounds or more overnight or 5 pounds in a week, increased swelling in our hands or feet or shortness of breath while lying flat in bed. Please call your doctor as soon as you notice any of these symptoms; do not wait until your next office visit. Patient armband removed and shredded       The discharge information has been reviewed with the {PATIENT PARENT GUARDIAN:[id number]}. The {PATIENT PARENT GUARDIAN:45309} verbalized understanding. Discharge medications reviewed with the {Dishcar meds reviewed GXAK:[id number]} and appropriate educational materials and side effects teaching were provided.   ___________________________________________________________________________________________________________________________________

## 2022-11-17 NOTE — PERIOP NOTES
TRANSFER - OUT REPORT:    Telephone Report report given to Tracie(name) on [facility] Leger  being transferred to [facility] [place](unit) for routine post - op       Report consisted of patients Situation, Background, Assessment and   Recommendations(SBAR). Information from the following report(s) SBAR, OR Summary, and MAR was reviewed with the receiving nurse. Lines:   Peripheral IV 11/17/22 Anterior; Left Forearm (Active)   Site Assessment Clean, dry, & intact 11/17/22 1406   Phlebitis Assessment 0 11/17/22 1406   Infiltration Assessment 0 11/17/22 1406   Dressing Status Clean, dry, & intact 11/17/22 1406   Dressing Type Tape;Transparent 11/17/22 1406   Hub Color/Line Status Pink; Infusing 11/17/22 1406   Alcohol Cap Used No 11/17/22 1006        Opportunity for questions and clarification was provided.       Patient transported with:   [employer]

## 2022-11-17 NOTE — ANESTHESIA PROCEDURE NOTES
Spinal Block    Start time: 11/17/2022 11:45 AM  End time: 11/17/2022 11:50 AM  Performed by: Shruthi Ramos CRNA  Authorized by: Gustavo James MD     Pre-procedure: Indications: at surgeon's request  Preanesthetic Checklist: patient identified, risks and benefits discussed, anesthesia consent, site marked, patient being monitored, timeout performed and fire risk safety assessment completed and verbalized    Timeout Time: 11:44 EST      Spinal Block:   Patient Position:  Seated  Prep Region:  Lumbar  Prep: Betadine      Location:  L4-5  Technique:  Single shot  Local: bupivacaine 0.75% in dextrose 8.25% preserv-free (SENSORCAINE) Intrathecal - Intrathecal, Back   10 mg - 11/17/2022 11:48:00 AM  Local Dose (mL):  1.3  Med Admin Time: 11/17/2022 11:48 AM    Needle:   Needle Type:   Nitin  Needle Gauge:  25 G  Attempts:  1      Events: CSF confirmed, no blood with aspiration and no paresthesia        Assessment:  Insertion:  Uncomplicated  Patient tolerance:  Patient tolerated the procedure well with no immediate complications  2 ml 1% lidocaine for local needle location

## 2022-11-17 NOTE — ANESTHESIA PREPROCEDURE EVALUATION
Relevant Problems   No relevant active problems       Anesthetic History     PONV          Review of Systems / Medical History  Patient summary reviewed and pertinent labs reviewed    Pulmonary            Asthma : well controlled       Neuro/Psych              Cardiovascular    Hypertension                   GI/Hepatic/Renal                Endo/Other        Arthritis     Other Findings              Physical Exam    Airway  Mallampati: II  TM Distance: 4 - 6 cm  Neck ROM: normal range of motion   Mouth opening: Normal     Cardiovascular    Rhythm: regular  Rate: normal         Dental    Dentition: Poor dentition     Pulmonary  Breath sounds clear to auscultation               Abdominal  GI exam deferred       Other Findings            Anesthetic Plan    ASA: 2  Anesthesia type: spinal and general - backup            Anesthetic plan and risks discussed with: Patient

## 2022-11-17 NOTE — ANESTHESIA POSTPROCEDURE EVALUATION
Procedure(s):  RIGHT TOTAL KNEE ARTHROPLASTY/MARY/ERAS-TJA. spinal, general - backup    Anesthesia Post Evaluation      Multimodal analgesia: multimodal analgesia used between 6 hours prior to anesthesia start to PACU discharge  Patient location during evaluation: PACU  Patient participation: complete - patient participated  Level of consciousness: awake and alert  Pain management: adequate  Airway patency: patent  Anesthetic complications: no  Cardiovascular status: acceptable  Respiratory status: acceptable  Hydration status: acceptable  Post anesthesia nausea and vomiting:  controlled  Final Post Anesthesia Temperature Assessment:  Normothermia (36.0-37.5 degrees C)      INITIAL Post-op Vital signs:   Vitals Value Taken Time   /64 11/17/22 1546   Temp 36.7 °C (98 °F) 11/17/22 1535   Pulse 76 11/17/22 1547   Resp 13 11/17/22 1547   SpO2 98 % 11/17/22 1547   Vitals shown include unvalidated device data.

## 2022-11-18 VITALS
RESPIRATION RATE: 17 BRPM | WEIGHT: 141.8 LBS | HEART RATE: 58 BPM | BODY MASS INDEX: 26.09 KG/M2 | DIASTOLIC BLOOD PRESSURE: 69 MMHG | TEMPERATURE: 98.8 F | OXYGEN SATURATION: 98 % | HEIGHT: 62 IN | SYSTOLIC BLOOD PRESSURE: 117 MMHG

## 2022-11-18 LAB
ANION GAP SERPL CALC-SCNC: 4 MMOL/L (ref 3–18)
BUN SERPL-MCNC: 15 MG/DL (ref 7–18)
BUN/CREAT SERPL: 16 (ref 12–20)
CALCIUM SERPL-MCNC: 8.2 MG/DL (ref 8.5–10.1)
CHLORIDE SERPL-SCNC: 110 MMOL/L (ref 100–111)
CO2 SERPL-SCNC: 26 MMOL/L (ref 21–32)
CREAT SERPL-MCNC: 0.93 MG/DL (ref 0.6–1.3)
ERYTHROCYTE [DISTWIDTH] IN BLOOD BY AUTOMATED COUNT: 12.4 % (ref 11.6–14.5)
GLUCOSE SERPL-MCNC: 131 MG/DL (ref 74–99)
HCT VFR BLD AUTO: 36.6 % (ref 35–45)
HGB BLD-MCNC: 11.9 G/DL (ref 12–16)
MCH RBC QN AUTO: 30.5 PG (ref 24–34)
MCHC RBC AUTO-ENTMCNC: 32.5 G/DL (ref 31–37)
MCV RBC AUTO: 93.8 FL (ref 78–100)
NRBC # BLD: 0 K/UL (ref 0–0.01)
NRBC BLD-RTO: 0 PER 100 WBC
PLATELET # BLD AUTO: 194 K/UL (ref 135–420)
PMV BLD AUTO: 9.9 FL (ref 9.2–11.8)
POTASSIUM SERPL-SCNC: 4.4 MMOL/L (ref 3.5–5.5)
RBC # BLD AUTO: 3.9 M/UL (ref 4.2–5.3)
SODIUM SERPL-SCNC: 140 MMOL/L (ref 136–145)
WBC # BLD AUTO: 6.4 K/UL (ref 4.6–13.2)

## 2022-11-18 PROCEDURE — 97165 OT EVAL LOW COMPLEX 30 MIN: CPT

## 2022-11-18 PROCEDURE — 80048 BASIC METABOLIC PNL TOTAL CA: CPT

## 2022-11-18 PROCEDURE — 74011250637 HC RX REV CODE- 250/637: Performed by: ORTHOPAEDIC SURGERY

## 2022-11-18 PROCEDURE — 97535 SELF CARE MNGMENT TRAINING: CPT

## 2022-11-18 PROCEDURE — 74011000250 HC RX REV CODE- 250: Performed by: ORTHOPAEDIC SURGERY

## 2022-11-18 PROCEDURE — 97530 THERAPEUTIC ACTIVITIES: CPT

## 2022-11-18 PROCEDURE — 36415 COLL VENOUS BLD VENIPUNCTURE: CPT

## 2022-11-18 PROCEDURE — 74011250636 HC RX REV CODE- 250/636: Performed by: ORTHOPAEDIC SURGERY

## 2022-11-18 PROCEDURE — 85027 COMPLETE CBC AUTOMATED: CPT

## 2022-11-18 PROCEDURE — 97116 GAIT TRAINING THERAPY: CPT

## 2022-11-18 PROCEDURE — 51798 US URINE CAPACITY MEASURE: CPT

## 2022-11-18 RX ADMIN — SERTRALINE 50 MG: 50 TABLET, FILM COATED ORAL at 09:22

## 2022-11-18 RX ADMIN — PANTOPRAZOLE SODIUM 40 MG: 40 TABLET, DELAYED RELEASE ORAL at 09:22

## 2022-11-18 RX ADMIN — ASPIRIN 81 MG: 81 TABLET, COATED ORAL at 09:22

## 2022-11-18 RX ADMIN — SODIUM CHLORIDE, PRESERVATIVE FREE 10 ML: 5 INJECTION INTRAVENOUS at 07:35

## 2022-11-18 RX ADMIN — OXYCODONE HYDROCHLORIDE 5 MG: 5 TABLET ORAL at 12:16

## 2022-11-18 RX ADMIN — OXYCODONE HYDROCHLORIDE 5 MG: 5 TABLET ORAL at 03:05

## 2022-11-18 RX ADMIN — POLYETHYLENE GLYCOL 3350 17 G: 17 POWDER, FOR SOLUTION ORAL at 09:22

## 2022-11-18 RX ADMIN — ACETAMINOPHEN 1000 MG: 500 TABLET ORAL at 07:20

## 2022-11-18 RX ADMIN — HYDROMORPHONE HYDROCHLORIDE 0.5 MG: 1 INJECTION, SOLUTION INTRAMUSCULAR; INTRAVENOUS; SUBCUTANEOUS at 03:51

## 2022-11-18 RX ADMIN — ZOLPIDEM TARTRATE 5 MG: 5 TABLET ORAL at 00:22

## 2022-11-18 RX ADMIN — MONTELUKAST 10 MG: 10 TABLET, FILM COATED ORAL at 09:22

## 2022-11-18 RX ADMIN — KETOROLAC TROMETHAMINE 15 MG: 15 INJECTION, SOLUTION INTRAMUSCULAR; INTRAVENOUS at 09:28

## 2022-11-18 RX ADMIN — SENNOSIDES AND DOCUSATE SODIUM 1 TABLET: 50; 8.6 TABLET ORAL at 09:22

## 2022-11-18 RX ADMIN — WATER 2 G: 1 INJECTION INTRAMUSCULAR; INTRAVENOUS; SUBCUTANEOUS at 03:08

## 2022-11-18 NOTE — ROUTINE PROCESS
Received report from [facility]. Pt AAOx3, NAD, breathing non labored, on room air, HOB up. IV site clean, dry and intact. Ace wrap dressing right knee in place. Family member at the bedside. Bed at the lowest level on lock position, call bell w/i reach. Bedside and Verbal shift change report given to CATE Willis (oncoming nurse) by Fredy Aggarwal RN (offgoing nurse). Report included the following information SBAR, Kardex, MAR and Recent Results. SITUATION:   Code Status: Full Code  Reason for Admission: Unilateral primary osteoarthritis, right knee [M17.11]  Osteoarthritis of right knee [G94.37]    Hospital day: 1  Problem List:           BACKGROUND:    Past Medical History:   Past Medical History:   Diagnosis Date    Asthma     Eczema     H/O transfusion of packed red blood cells 2022    2 units    HELLP (hemolytic anemia/elev liver enzymes/low platelets in pregnancy), third trimester     Ill-defined condition     knee injury x 2 related to rock climbing    Nausea & vomiting     Osteoarthritis of right knee     Preeclampsia     2nd trimester    Rosacea     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         Patient taking anticoagulants no     ASSESSMENT:   Changes in Assessment Throughout Shift: urinary retention, bladder scanned, ugalde cath in    Patient has Central Line: no Reasons if yes: na  Patient has Ugalde Cath: yes Reasons if yes: urinary retention     Last Vitals:     Vitals:    22 1618 22 1619 22 2322   BP:  107/68 (!) 124/50 111/62   Pulse:  69 68 (!) 58   Resp: 25 17 16 16   Temp:  99.2 °F (37.3 °C) 99 °F (37.2 °C) 98.2 °F (36.8 °C)   SpO2: 99% 97% 98% 96%   Weight:       Height:           IV and DRAINS (will only show if present)   Peripheral IV 22 Anterior; Left Forearm-Site Assessment: Clean, dry, & intact    WOUND (if present)   Wound Type:  surgical incision   Dressing present Dressing Present : No   Wound Concerns/Notes: none    PAIN    Pain Assessment    Pain Intensity 1: 0 (11/18/22 0055)    Pain Location 1: Leg    Pain Intervention(s) 1: Medication (see MAR), Cold pack    Patient Stated Pain Goal: 1  Interventions for Pain:  yes  Intervention effective: no  Time of last intervention: 0351   Reassessment Completed: yes     Last 3 Weights:  Last 3 Recorded Weights in this Encounter    11/09/22 1459 11/17/22 1000   Weight: 62.6 kg (138 lb) 64.3 kg (141 lb 12.8 oz)     Weight change:     INTAKE/OUPUT    Current Shift: 11/17 1901 - 11/18 0700  In: 460 [P.O.:460]  Out: 100 [Urine:100]    Last three shifts: 11/16 0701 - 11/17 1900  In: 2040 [P.O.:300; I.V.:1740]  Out: 800 [Urine:700]    LAB RESULTS   No results for input(s): WBC, HGB, HCT, PLT, HGBEXT, HCTEXT, PLTEXT in the last 72 hours. No results for input(s): NA, K, GLU, BUN, CREA, CA, MG, INR, INREXT in the last 72 hours. No lab exists for component: PT, PTT    RECOMMENDATIONS AND DISCHARGE PLANNING     Pending tests/procedures/ Plan of Care or Other Needs: PT eval     Discharge plan for patient and Needs/Barriers: home    Estimated Discharge Date: 11/18 Posted on Whiteboard in [address] [place] [address] Room: yes      4. The patient's care plan was reviewed with the oncoming nurse. \"HEALS\" SAFETY CHECK      Fall Risk    Total Score: 3    Safety Measures: Safety Measures: Bed/Chair-Wheels locked, Bed in low position, Call light within reach, Family at bedside, Gripper socks, Side rails X 3    A safety check occurred in the patient's room between off going nurse and oncoming nurse listed above. The safety check included the below items  Area Items   H  High Alert Medications Verify all high alert medication drips (heparin, PCA, etc.)   E  Equipment Suction is set up for ALL patients (with yanker)  Red plugs utilized for all equipment (IV pumps, etc.)  WOWs wiped down at end of shift.   Room stocked with oxygen, suction, and other unit-specific supplies   A  Alarms Bed alarm is set for fall risk patients  Ensure chair alarm is in place and activated if patient is up in a chair   L  Lines Check IV for any infiltration  Goldman bag is empty if patient has a Goldman   Tubing and IV bags are labeled   S  Safety   Room is clean, patient is clean, and equipment is clean. Hallways are clear from equipment besides carts. Fall bracelet on for fall risk patients  Ensure room is clear and free of clutter  Suction is set up for ALL patients (with lillianaker)  Hallways are clear from equipment besides carts.    Isolation precautions followed, supplies available outside room, sign posted     Peter Ochoa RN

## 2022-11-18 NOTE — DISCHARGE SUMMARY
DISCHARGE SUMMARY    Patient: Mary Bourgeois MRN: [id number]  CSN: [id number]    YOB: 1970  Age: 47 y.o. Sex: female              Admit Date: 11/17/2022    Discharge Date:  11/18/22    Admission Diagnoses: Unilateral primary osteoarthritis, right knee [M17.11]  Osteoarthritis of right knee [M17.11]    Discharge Diagnoses:    Problem List as of 11/18/2022 Never Reviewed            Codes Class Noted - Resolved    RESOLVED: Osteoarthritis of right knee ICD-10-CM: M17.11  ICD-9-CM: 715.96  11/17/2022 - 11/17/2022           Discharge Condition: Good    Hospital Course: On the day of admission the patient underwent a Right Total knee without complications. Tolerated procedure well. Patient remained on 24 hours perioperative antibiotics. VTE Prophylaxis was TEDs/SCDs/early mobilization and  apsirin. No signs or symptoms of VTE during the hospitalization. The patient was hemodynamically stable throughout the course of the admission. The postoperative hemoglobin were    Recent Labs     11/18/22  0532   HGB 11.9*   . There were no transfusions. The wound was clean and dry prior to discharge. The patient was able to ambulate WBAT, tolerate a PO diet, void spontaneously,  and had adequate pain control with oral medications at time of discharge. Kept on Orthopedic unit for routine post-op needs. No complications. the patient was doing gell and was discharged to Home. Discharge Medications:     Current Discharge Medication List        CONTINUE these medications which have NOT CHANGED    Details   montelukast (SINGULAIR) 10 mg tablet Take 10 mg by mouth daily. EPINEPHrine (EPIPEN) 0.3 mg/0.3 mL injection as needed. benralizumab 30 mg/mL atIn by SubCUTAneous route every two (2) months. Approximate last dose      budesonide-glycopyr-formoterol (Breztri Aerosphere) 160-9-4.8 mcg/actuation HFAA Take 2 Puffs by inhalation daily.       carboxymethylcellulose sodium (CELLUVISC) 0.5 % drop ophthalmic solution Apply 0.5 % to eye four (4) times daily as needed. metroNIDAZOLE (METROGEL) 1 % topical gel two (2) times a day. sertraline (ZOLOFT) 100 mg tablet Take  by mouth daily. Takes 1/2 tab daily      zolpidem (AMBIEN) 10 mg tablet Take 10 mg by mouth nightly as needed. fexofenadine (ALLEGRA) 180 mg tablet Take 180 mg by mouth daily. diphenhydrAMINE (BenadryL) 25 mg capsule Take 25 mg by mouth every six (6) hours as needed. OTHER four (4) times daily as needed. Gabapentin 6%  Ketamine 5%  Ketoprofen 3%  DMSO 2%  Compounded cream  For knee pain      albuterol (PROVENTIL HFA, VENTOLIN HFA, PROAIR HFA) 90 mcg/actuation inhaler Take 2 Puffs by inhalation as needed for Wheezing. STOP taking these medications       traMADoL (ULTRAM) 50 mg tablet Comments:   Reason for Stopping:               Activity: As tolerated. No driving. Diet: Regular Diet    Wound Care: Keep wound clean and dry    Follow-up: as scheduled in 2 weeks.

## 2022-11-18 NOTE — PROGRESS NOTES
Discharge teaching completed at bedside with patient. Opportunity provided for clarifying questions. All answered to patient satisfaction. IV removed. ID removed and shredded.

## 2022-11-18 NOTE — PROGRESS NOTES
S:   No events  Ambulated  No chest pain, shortness of breath, nausea,vomiting, fever, or chills  Tolerating PO  Voided some but ugalde placed for high residual.    High BG last evening    O:   Patient Vitals for the past 8 hrs:   Temp Pulse Resp BP SpO2   11/18/22 0303 97.9 °F (36.6 °C) 66 18 110/60 97 %   11/17/22 2322 98.2 °F (36.8 °C) (!) 58 16 111/62 96 %          Alert, Oriented, NAD, non labored  Operative extremity:  Dressing clean, dry, intact  Extremity 2+, no swelling, sensation and motor intact throughout. No calf pain. Recent Results (from the past 12 hour(s))   GLUCOSE, POC    Collection Time: 11/17/22  9:38 PM   Result Value Ref Range    Glucose (POC) 256 (H) 70 - 867 mg/dL   METABOLIC PANEL, BASIC    Collection Time: 11/18/22  5:32 AM   Result Value Ref Range    Sodium 140 136 - 145 mmol/L    Potassium 4.4 3.5 - 5.5 mmol/L    Chloride 110 100 - 111 mmol/L    CO2 26 21 - 32 mmol/L    Anion gap 4 3.0 - 18 mmol/L    Glucose 131 (H) 74 - 99 mg/dL    BUN 15 7.0 - 18 MG/DL    Creatinine 0.93 0.6 - 1.3 MG/DL    BUN/Creatinine ratio 16 12 - 20      eGFR >60 >60 ml/min/1.73m2    Calcium 8.2 (L) 8.5 - 10.1 MG/DL   CBC W/O DIFF    Collection Time: 11/18/22  5:32 AM   Result Value Ref Range    WBC 6.4 4.6 - 13.2 K/uL    RBC 3.90 (L) 4.20 - 5.30 M/uL    HGB 11.9 (L) 12.0 - 16.0 g/dL    HCT 36.6 35.0 - 45.0 %    MCV 93.8 78.0 - 100.0 FL    MCH 30.5 24.0 - 34.0 PG    MCHC 32.5 31.0 - 37.0 g/dL    RDW 12.4 11.6 - 14.5 %    PLATELET 592 316 - 221 K/uL    MPV 9.9 9.2 - 11.8 FL    NRBC 0.0 0  WBC    ABSOLUTE NRBC 0.00 0.00 - 0.01 K/uL        A/P:   47 y.o. female post-op day 1 Day Post-Op status post Procedure(s):  RIGHT TOTAL KNEE ARTHROPLASTY/MARY/ERAS-TJA doing well. Postoperative protocol discussed. Hold am decadron. Ugalde out this am   - WBAT, OOB as much as tolerated.   - Abx x 24hours post-op (finish AM of POD#1)  - DVT prophy: TEDs, SCDs, aspirin  - Pain control: Tylenol, Nsaid (Toradol then oral), Tramadol, Oxycodone  - Labs: Acute blood loss anemia as expected  - PT/OT  - Dispo: D/C when clears physical therapy, voids, and pain controlled      Christine Mishra MD  11/18/2022  7:18 AM

## 2022-11-18 NOTE — PROGRESS NOTES
OCCUPATIONAL THERAPY EVALUATION/DISCHARGE    Patient: Kirsten Goldman (55 y.o. female)  Date: 11/18/2022  Primary Diagnosis: Unilateral primary osteoarthritis, right knee [M17.11]  Osteoarthritis of right knee [M17.11]  Procedure(s) (LRB):  RIGHT TOTAL KNEE ARTHROPLASTY/MAYR/ERAS-TJA (Right) 1 Day Post-Op   Precautions:   Fall, WBAT (RLE)  PLOF: Pt lives with spouse, independent. ASSESSMENT AND RECOMMENDATIONS:  Pt presents in bed, alert and agreeable to participate in OT. Based on the objective data described below, the patient demonstrates ability to perform basic self care tasks and functional transfers without assistance. Pt performed full morning ADL routine, benefiting from supervision for standing aspects of bathing and dressing. Pt initially reports mild dizziness with functional mobility, which resolves quickly. Pt benefits from occasional Supervision for functional mobility using RW. Patient has a supportive spouse at home to assist her prn and all needed DME (RW, shower chair) for home safety. Skilled occupational therapy is not indicated at this time. Further Equipment Recommendations for Discharge: N/A    AMPAC: At this time and based on an AM-PAC score of 21/24, no further OT is recommended upon discharge due to patient at baseline functional status. Pt's spouse will be assisting with ADLs as needed during recovery. Recommend patient returns to prior setting with prior services. Pt reports she has outpatient PT services set up. This AMPAC score should be considered in conjunction with interdisciplinary team recommendations to determine the most appropriate discharge setting. Patient's social support, diagnosis, medical stability, and prior level of function should also be taken into consideration. SUBJECTIVE:   Patient stated I stay active. 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     Past Surgical History:   Procedure Laterality Date    HX BREAST REDUCTION  2019    HX  SECTION  2002    triplets    HX GYN  2019    uterine ablation and TOT sling    HX HERNIA REPAIR  0489    umbilical hernia, tummy tuck    HX OTHER SURGICAL      selective laser photocoagulation of communicating vessels    HX TONSILLECTOMY      HX UROLOGICAL  2020    removal of TOT sling    HX UROLOGICAL  2021    removal of scar tissue, bladder prolapse correction    HX WISDOM TEETH EXTRACTION       Barriers to Learning/Limitations: None  Compensate with: visual, verbal, tactile, kinesthetic cues/model    Home Situation:   Home Situation  Home Environment: Apartment  # Steps to Enter: 0  One/Two Story Residence: Other (Comment)  Living Alone: No  Support Systems: Spouse/Significant Other (Patient lives with her . )  Patient Expects to be Discharged to[de-identified] Home with family assistance  Current DME Used/Available at Home: Shower chair, Walker, rolling, Cane, straight  Tub or Shower Type: Shower  []     Right hand dominant   []     Left hand dominant    Cognitive/Behavioral Status:  Neurologic State: Alert  Orientation Level: Oriented X4  Cognition: Follows commands  Safety/Judgement: Awareness of environment; Fall prevention    Skin: visible skin intact  Edema: none noted    Vision/Perceptual:       Acuity: Able to read clock/calendar on wall without difficulty     Coordination: BUE  Coordination: Within functional limits  Fine Motor Skills-Upper: Left Intact; Right Intact    Gross Motor Skills-Upper: Left Intact; Right Intact    Balance:  Sitting: Intact  Standing: Impaired; With support  Standing - Static: Good  Standing - Dynamic : Good    Strength: BUE    Strength: Generally decreased, functional      Tone & Sensation: BUE    Tone: Normal  Sensation: Intact   Range of Motion: BUE    AROM: Within functional limits     Functional Mobility and Transfers for ADLs:  Bed Mobility:     Supine to Sit: Supervision   Transfers:  Sit to Stand: Supervision  Stand to Sit: Supervision   Toilet Transfer : Supervision    Bathroom Mobility: Supervision/set up    ADL Assessment:  Feeding: Independent    Oral Facial Hygiene/Grooming: Modified Independent    Bathing: Supervision    Upper Body Dressing: Modified independent    Lower Body Dressing: Supervision    Toileting: Supervision     ADL Intervention:     Cognitive Retraining  Safety/Judgement: Awareness of environment; Fall prevention  Pain:  Pain level pre-treatment: 2/10   Pain level post-treatment: 2/10     Activity Tolerance:   Fair  Please refer to the flowsheet for vital signs taken during this treatment. After treatment:   [x]  Patient left in no apparent distress sitting up in chair  []  Patient left in no apparent distress in bed  [x]  Call bell left within reach  [x]  Nursing notified  [x]  Caregiver present  []  Bed alarm activated    COMMUNICATION/EDUCATION:   [x]      Role of Occupational Therapy in the acute care setting  [x]      Home safety education was provided and the patient/caregiver indicated understanding. []      Patient/family have participated as able and agree with findings and recommendations. []      Patient is unable to participate in plan of care at this time. Thank you for this referral.  Anita Cheema OTR/L  Time Calculation: 34 mins      Eval Complexity: History: LOW Complexity : Brief history review ;    Examination: LOW Complexity : 1-3 performance deficits relating to physical, cognitive , or psychosocial skils that result in activity limitations and / or participation restrictions ; Decision Making:LOW Complexity : No comorbidities that affect functional and no verbal or physical assistance needed to complete eval tasks     [place] AM-PAC® Daily Activity Inpatient Short Form (6-Clicks)*    How much HELP from another person does the patient currently need    (If the patient hasn't done an activity recently, how much help from another person do you think he/she would need if he/she tried?)   Total (Total A or Dep)   A Lot  (Mod to Max A)   A Little (Sup or Min A)   None (Mod I to I)   Putting on and taking off regular lower body clothing? [] 1 [] 2 [x] 3 [] 4   2. Bathing (including washing, rinsing,      drying)? [] 1 [] 2 [x] 3 [] 4   3. Toileting, which includes using toilet, bedpan or urinal?   [] 1 [] 2 [x] 3 [] 4   4. Putting on and taking off regular upper body clothing? [] 1 [] 2 [] 3 [x] 4   5. Taking care of personal grooming such as brushing teeth? [] 1 [] 2 [] 3 [x] 4   6. Eating meals? [] 1 [] 2 [] 3 [x] 4       At this time and based on an AM-PAC score of 21/24, no further OT is recommended upon discharge due to patient at baseline functional status. Pt's spouse will be assisting with ADLs as needed during recovery. Recommend patient returns to prior setting with prior services. Pt reports she has outpatient PT services set up.

## 2022-11-18 NOTE — PROGRESS NOTES
Problem: Falls - Risk of  Goal: *Absence of Falls  Description: Document Cherylle Cockayne Fall Risk and appropriate interventions in the flowsheet.   Outcome: Resolved/Met     Problem: Patient Education: Go to Patient Education Activity  Goal: Patient/Family Education  Outcome: Resolved/Met     Problem: Pain  Goal: *Control of Pain  Outcome: Resolved/Met     Problem: Patient Education: Go to Patient Education Activity  Goal: Patient/Family Education  Outcome: Resolved/Met     Problem: Patient Education: Go to Patient Education Activity  Goal: Patient/Family Education  Outcome: Resolved/Met     Problem: Patient Education: Go to Patient Education Activity  Goal: Patient/Family Education  Outcome: Resolved/Met     Problem: Knee Replacement: Day of Surgery/Unit  Goal: Off Pathway (Use only if patient is Off Pathway)  Outcome: Resolved/Met  Goal: Activity/Safety  Outcome: Resolved/Met  Goal: Consults, if ordered  Outcome: Resolved/Met  Goal: Diagnostic Test/Procedures  Outcome: Resolved/Met  Goal: Nutrition/Diet  Outcome: Resolved/Met  Goal: Medications  Outcome: Resolved/Met  Goal: Respiratory  Outcome: Resolved/Met  Goal: Treatments/Interventions/Procedures  Outcome: Resolved/Met  Goal: Psychosocial  Outcome: Resolved/Met  Goal: *Initiate mobility  Outcome: Resolved/Met  Goal: *Optimal pain control at patient's stated goal  Outcome: Resolved/Met  Goal: *Hemodynamically stable  Outcome: Resolved/Met     Problem: Patient Education: Go to Patient Education Activity  Goal: Patient/Family Education  Outcome: Resolved/Met

## 2022-11-18 NOTE — PROGRESS NOTES
conducted an initial consultation and Spiritual Assessment for Torie Leger, who is a 47 y.o.,female. Patients Primary Language is: [place]. According to the patients EMR Ho[employer]ism Affiliation is: Mandaen. The reason the Patient came to the hospital is: There are no problems to display for this patient. The  provided the following Interventions:  Initiated a relationship of care and support. Listened empathically. Provided chaplaincy education. Provided information about Spiritual Care Services. Offered prayer and assurance of continued prayers on patient's behalf. Chart reviewed. The following outcomes where achieved:  Patient shared limited information about both their medical narrative and spiritual journey/beliefs. Patient processed feeling about current hospitalization. Patient expressed gratitude for 's visit. Assessment:  Patient does not have any Church/cultural needs that will affect patients preferences in health care. There are no spiritual or Church issues which require intervention at this time. Plan:  Chaplains will continue to follow and will provide pastoral care on an as needed/requested basis.  recommends bedside caregivers page  on duty if patient shows signs of acute spiritual or emotional distress.       [address] [facility]   [phone]

## 2022-11-18 NOTE — PROGRESS NOTES
Problem: Falls - Risk of  Goal: *Absence of Falls  Description: Document Sofia Fothergill Fall Risk and appropriate interventions in the flowsheet.   Outcome: Progressing Towards Goal  Note: Fall Risk Interventions:  Mobility Interventions: Assess mobility with egress test, Patient to call before getting OOB, Utilize walker, cane, or other assistive device         Medication Interventions: Assess postural VS orthostatic hypotension, Evaluate medications/consider consulting pharmacy, Patient to call before getting OOB    Elimination Interventions: Call light in reach, Elevated toilet seat, Patient to call for help with toileting needs, Stay With Me (per policy), Toilet paper/wipes in reach, Toileting schedule/hourly rounds              Problem: Patient Education: Go to Patient Education Activity  Goal: Patient/Family Education  Outcome: Progressing Towards Goal     Problem: Pain  Goal: *Control of Pain  Outcome: Progressing Towards Goal     Problem: Patient Education: Go to Patient Education Activity  Goal: Patient/Family Education  Outcome: Progressing Towards Goal     Problem: Patient Education: Go to Patient Education Activity  Goal: Patient/Family Education  Outcome: Progressing Towards Goal     Problem: Patient Education: Go to Patient Education Activity  Goal: Patient/Family Education  Outcome: Progressing Towards Goal     Problem: Knee Replacement: Day of Surgery/Unit  Goal: Off Pathway (Use only if patient is Off Pathway)  Outcome: Progressing Towards Goal  Goal: Activity/Safety  Outcome: Progressing Towards Goal  Goal: Consults, if ordered  Outcome: Progressing Towards Goal  Goal: Diagnostic Test/Procedures  Outcome: Progressing Towards Goal  Goal: Nutrition/Diet  Outcome: Progressing Towards Goal  Goal: Medications  Outcome: Progressing Towards Goal  Goal: Respiratory  Outcome: Progressing Towards Goal  Goal: Treatments/Interventions/Procedures  Outcome: Progressing Towards Goal  Goal: Psychosocial  Outcome: Progressing Towards Goal  Goal: *Initiate mobility  Outcome: Progressing Towards Goal  Goal: *Optimal pain control at patient's stated goal  Outcome: Progressing Towards Goal  Goal: *Hemodynamically stable  Outcome: Progressing Towards Goal

## 2022-11-18 NOTE — PROGRESS NOTES
D/C order noted for today. Orders reviewed. No needs identified at this time. CM spoke with patient's , he will be transporting patient back home to [place] today at time of discharge. CM remains available if needed.              Urszula Hartman, CATE [phone]

## 2022-11-18 NOTE — PROGRESS NOTES
Problem: Mobility Impaired (Adult and Pediatric)  Goal: *Acute Goals and Plan of Care (Insert Text)  Description: Physical Therapy Goals  Initiated 11/17/2022 and to be accomplished within 7 day(s)  1. Patient will move from supine to sit and sit to supine , scoot up and down, and roll side to side in bed with modified independence. 2.  Patient will transfer from bed to chair and chair to bed with modified independence using the least restrictive device. 3.  Patient will perform sit to stand with modified independence. 4.  Patient will ambulate with modified independence for 200 feet with the least restrictive device. 5.  Patient will ascend/descend 1 stairs with 0 handrail(s) with modified independence. PLOF: Pt reporting she lives in 1 story house with 1 WERO with , independent at home, uses [facility] intermittently with increased pain. Pt is staying at [facility] for 1-2 nights, friend's house set up is apartment with elevator access. Outcome: Resolved/Met   PHYSICAL THERAPY TREATMENT AND DISCHARGE    Patient: Madelyn Coley (55 y.o. female)  Date: 11/18/2022  Diagnosis: Unilateral primary osteoarthritis, right knee [M17.11]  Osteoarthritis of right knee [M17.11] <principal problem not specified>  Procedure(s) (LRB):  RIGHT TOTAL KNEE ARTHROPLASTY/MARY/ERAS-TJA (Right) 1 Day Post-Op  Precautions: Fall, WBAT (RLE)  PLOF: see above    ASSESSMENT:  Based on the objective data described below, the patient presents at a mod I level with transfers and supervision with ambulation and transfers using a RW. Pt was educated on activity pacing and ROM exercises to decreased stiffness as she has a long car ride to return home to [place]. Pt verbalized understanding of all education and is clear for discharge home from a PT standpoint. PLAN:  Maximum therapeutic gains met at current level of care and patient will be discharged from physical therapy at this time.   Rationale for discharge:  [x]     Goals Achieved  []     Plateau Reached  []     Patient not participating in therapy  []     Other:    Further Equipment Recommendations for Discharge:  rolling walker    AMPAC: At this time and based on an AM-PAC score of 22/24 (or **/20 if omitting stairs), no further PT is recommended upon discharge due to patient at a mod I/supervision level, to begin outpatient PT if/when ordered by orthopedic MD.  Recommend patient returns to prior setting with prior services. This AMPAC score should be considered in conjunction with interdisciplinary team recommendations to determine the most appropriate discharge setting. Patient's social support, diagnosis, medical stability, and prior level of function should also be taken into consideration. SUBJECTIVE:   Patient stated I'm feeling pretty good, my pain is more in my hip region.     OBJECTIVE DATA SUMMARY:   Critical Behavior:  Neurologic State: Alert  Orientation Level: Oriented X4  Cognition: Follows commands  Safety/Judgement: Awareness of environment, Fall prevention  Functional Mobility Training:  Bed Mobility:  Rolling: Independent  Supine to Sit: Independent  Sit to Supine: Independent  Scooting: Independent  Transfers:  Sit to Stand: Modified independent  Stand to Sit: Modified independent     Balance:  Sitting: Intact  Standing: Intact; With support  Standing - Static: Good  Standing - Dynamic : Good   Range Of Motion:   AROM: Within functional limits   Ambulation/Gait Training:  Distance (ft): 200 Feet (ft)  Assistive Device: Walker, rolling  Ambulation - Level of Assistance: Supervision     Stairs:  Number of Stairs Trained: 4  Stairs - Level of Assistance: Stand-by assistance  Rail Use: Both  Therapeutic Exercises:         EXERCISE   Sets   Reps   Active Active Assist   Passive Self ROM   Comments   Ankle Pumps 1 10  [x] [] [] []    Quad Sets/Glut Sets    [] [] [] [] Hold for 5 secs   Hamstring Sets   [] [] [] []    Short Arc Quads [] [] [] []    Heel Slides 1 10 [x] [] [] []    Straight Leg Raises   [] [] [] []    Hip Add   [] [] [] [] Hold for 5 secs, w/ pillow squeeze   Long Arc Quads   [] [] [] []    Seated Marching   [] [] [] []    Standing Marching   [] [] [] []       [] [] [] []        Pain:  Pain level pre-treatment: 3/10   Pain level post-treatment: 3/10   Pain Intervention(s):  Rest, Ice, Repositioning   Response to intervention: Nurse notified, See doc flow    Activity Tolerance:   Good    Please refer to the flowsheet for vital signs taken during this treatment. After treatment:   [] Patient left in no apparent distress sitting up in chair  [x] Patient left in no apparent distress in bed  [x] Call bell left within reach  [x] Nursing notified  [x] Caregiver present  [] Bed alarm activated  [] SCDs applied      COMMUNICATION/EDUCATION:   [x]         Role of Physical Therapy in the acute care setting. [x]         Fall prevention education was provided and the patient/caregiver indicated understanding. [x]         Patient/family have participated as able and agree with findings and recommendations. []         Patient is unable to participate in plan of care at this time. []         Other:        Lillie Chirinos, PT   Time Calculation: 23 mins    [address] [place] [address] [address] [id number] AM-PAC® Basic Mobility Inpatient Short Form (6-Clicks) Version 2    How much HELP from another person does the patient currently need    (If the patient hasn't done an activity recently, how much help from another person do you think he/she would need if he/she tried?)   Total (Total A or Dep)   A Lot  (Mod to Max A)   A Little (Sup or Min A)   None (Mod I to I)   Turning from your back to your side while in a flat bed without using bedrails? [] 1 [] 2 [] 3 [x] 4   2. Moving from lying on your back to sitting on the side of a flat bed without using bedrails? [] 1 [] 2 [] 3 [x] 4   3. Moving to and from a bed to a chair (including a wheelchair)?    [] 1 [] 2 [] 3 [x] 4 4. Standing up from a chair using your arms (e.g., wheelchair, or bedside chair)? [] 1 [] 2 [] 3 [x] 4   5. Walking in hospital room? [] 1 [] 2 [x] 3 [] 4   6. Climbing 3-5 steps with a railing?+   [] 1 [] 2 [x] 3 [] 4   +If stair climbing cannot be assessed, skip item #6. Sum responses from items 1-5. At this time and based on an AM-PAC score of 22/24 (or **/20 if omitting stairs), no further PT is recommended upon discharge due to patient at a mod I/supervision level, to begin outpatient PT if/when ordered by orthopedic MD.  Recommend patient returns to prior setting with prior services.

## 2022-11-18 NOTE — PROGRESS NOTES
Care Management Interventions  PCP Verified by CM: Yes ([address] in [place]. )  Mode of Transport at Discharge: Self (Patient's  will be transporting patient home at time of discharge. )  Transition of Care Consult (CM Consult): Discharge Planning  Discharge Durable Medical Equipment: No  Physical Therapy Consult: Yes  Occupational Therapy Consult: Yes  Speech Therapy Consult: No  Support Systems: Spouse/Significant Other (Patient lives with her . )  Confirm Follow Up Transport: Family  Discharge Location  Patient Expects to be Discharged to[de-identified] Home with family assistance      Patient lives with her , with no steps to enter. Patient has DME Rolling Walker. Patient was not using [address] at home, has not stayed at [facility] or Rehab before, is not a Dialysis patient, and was not using Oxygen at home. No CM needs at this time. Patient's  will be transporting patient home to [place] today at time of discharge.                  Anu Santana, RN  Case Management [phone]

## 2022-11-18 NOTE — ROUTINE PROCESS
1600 patient received via bed alertx4, dressing cdi. Patient instructed on using I.S.  stated patient was incontinent of urib=ne  Oob to bathroom unable to void  Physi in working with batient  Toradol given for pain  Patient still complaing of pain  Ultram given  Bladder scan 292  Patient very uncomforable with bladder pain  1810 patient straight cath.  700ccobtain  Patient resting at present  Bedside shift report given to Evon Juarez with sbar and kardex

## 2024-07-12 ENCOUNTER — HOSPITAL ENCOUNTER (EMERGENCY)
Facility: HOSPITAL | Age: 54
Discharge: HOME | End: 2024-07-12
Attending: STUDENT IN AN ORGANIZED HEALTH CARE EDUCATION/TRAINING PROGRAM
Payer: OTHER GOVERNMENT

## 2024-07-12 ENCOUNTER — APPOINTMENT (OUTPATIENT)
Facility: HOSPITAL | Age: 54
End: 2024-07-12
Payer: OTHER GOVERNMENT

## 2024-07-12 VITALS
BODY MASS INDEX: 24.25 KG/M2 | DIASTOLIC BLOOD PRESSURE: 94 MMHG | HEART RATE: 86 BPM | TEMPERATURE: 98.6 F | SYSTOLIC BLOOD PRESSURE: 132 MMHG | RESPIRATION RATE: 20 BRPM | WEIGHT: 160 LBS | OXYGEN SATURATION: 99 % | HEIGHT: 68 IN

## 2024-07-12 DIAGNOSIS — S32.009A CLOSED FRACTURE OF TRANSVERSE PROCESS OF LUMBAR VERTEBRA, INITIAL ENCOUNTER: ICD-10-CM

## 2024-07-12 DIAGNOSIS — S12.9XXA CLOSED FRACTURE OF CERVICAL VERTEBRAL BODY: Primary | ICD-10-CM
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
## 2024-07-12 LAB
ALBUMIN SERPL-MCNC: 4 G/DL (ref 3.5–5.2)
ALBUMIN/GLOB SERPL: 2 G/DL
ALP SERPL-CCNC: 48 U/L (ref 39–117)
ALT SERPL W P-5'-P-CCNC: 31 U/L (ref 1–33)
ANION GAP SERPL CALCULATED.3IONS-SCNC: 8.7 MMOL/L (ref 5–15)
AST SERPL-CCNC: 34 U/L (ref 1–32)
BASOPHILS # BLD AUTO: 0.01 10*3/MM3 (ref 0–0.2)
BASOPHILS NFR BLD AUTO: 0.1 % (ref 0–1.5)
BILIRUB SERPL-MCNC: 0.3 MG/DL (ref 0–1.2)
BUN SERPL-MCNC: 18 MG/DL (ref 6–20)
BUN/CREAT SERPL: 18.2 (ref 7–25)
CALCIUM SPEC-SCNC: 8.8 MG/DL (ref 8.6–10.5)
CHLORIDE SERPL-SCNC: 107 MMOL/L (ref 98–107)
CO2 SERPL-SCNC: 24.3 MMOL/L (ref 22–29)
CREAT SERPL-MCNC: 0.99 MG/DL (ref 0.57–1)
DEPRECATED RDW RBC AUTO: 41.9 FL (ref 37–54)
EGFRCR SERPLBLD CKD-EPI 2021: 68.7 ML/MIN/1.73
EOSINOPHIL # BLD AUTO: 0 10*3/MM3 (ref 0–0.4)
EOSINOPHIL NFR BLD AUTO: 0 % (ref 0.3–6.2)
ERYTHROCYTE [DISTWIDTH] IN BLOOD BY AUTOMATED COUNT: 12.2 % (ref 12.3–15.4)
GLOBULIN UR ELPH-MCNC: 2 GM/DL
GLUCOSE SERPL-MCNC: 100 MG/DL (ref 65–99)
HCT VFR BLD AUTO: 40 % (ref 34–46.6)
HGB BLD-MCNC: 13.7 G/DL (ref 12–15.9)
HOLD SPECIMEN: NORMAL
HOLD SPECIMEN: NORMAL
IMM GRANULOCYTES # BLD AUTO: 0.05 10*3/MM3 (ref 0–0.05)
IMM GRANULOCYTES NFR BLD AUTO: 0.4 % (ref 0–0.5)
LYMPHOCYTES # BLD AUTO: 0.84 10*3/MM3 (ref 0.7–3.1)
LYMPHOCYTES NFR BLD AUTO: 7.5 % (ref 19.6–45.3)
MCH RBC QN AUTO: 31.4 PG (ref 26.6–33)
MCHC RBC AUTO-ENTMCNC: 34.3 G/DL (ref 31.5–35.7)
MCV RBC AUTO: 91.7 FL (ref 79–97)
MONOCYTES # BLD AUTO: 0.92 10*3/MM3 (ref 0.1–0.9)
MONOCYTES NFR BLD AUTO: 8.2 % (ref 5–12)
NEUTROPHILS NFR BLD AUTO: 83.8 % (ref 42.7–76)
NEUTROPHILS NFR BLD AUTO: 9.41 10*3/MM3 (ref 1.7–7)
PLATELET # BLD AUTO: 221 10*3/MM3 (ref 140–450)
PMV BLD AUTO: 9.6 FL (ref 6–12)
POTASSIUM SERPL-SCNC: 4.4 MMOL/L (ref 3.5–5.2)
PROT SERPL-MCNC: 6 G/DL (ref 6–8.5)
RBC # BLD AUTO: 4.36 10*6/MM3 (ref 3.77–5.28)
SODIUM SERPL-SCNC: 140 MMOL/L (ref 136–145)
WBC NRBC COR # BLD AUTO: 11.23 10*3/MM3 (ref 3.4–10.8)
WHOLE BLOOD HOLD COAG: NORMAL

## 2024-07-12 PROCEDURE — 85025 COMPLETE CBC W/AUTO DIFF WBC: CPT | Performed by: PHYSICIAN ASSISTANT

## 2024-07-12 PROCEDURE — 74177 CT ABD & PELVIS W/CONTRAST: CPT

## 2024-07-12 PROCEDURE — 71260 CT THORAX DX C+: CPT

## 2024-07-12 PROCEDURE — 96374 THER/PROPH/DIAG INJ IV PUSH: CPT

## 2024-07-12 PROCEDURE — 25510000001 IOPAMIDOL 61 % SOLUTION: Performed by: STUDENT IN AN ORGANIZED HEALTH CARE EDUCATION/TRAINING PROGRAM

## 2024-07-12 PROCEDURE — 72128 CT CHEST SPINE W/O DYE: CPT

## 2024-07-12 PROCEDURE — 25010000002 FENTANYL CITRATE (PF) 50 MCG/ML SOLUTION: Performed by: STUDENT IN AN ORGANIZED HEALTH CARE EDUCATION/TRAINING PROGRAM

## 2024-07-12 PROCEDURE — 25010000002 KETOROLAC TROMETHAMINE PER 15 MG: Performed by: PHYSICIAN ASSISTANT

## 2024-07-12 PROCEDURE — 25010000002 ONDANSETRON PER 1 MG

## 2024-07-12 PROCEDURE — 80053 COMPREHEN METABOLIC PANEL: CPT | Performed by: PHYSICIAN ASSISTANT

## 2024-07-12 PROCEDURE — 25010000002 HYDROMORPHONE PER 4 MG: Performed by: PHYSICIAN ASSISTANT

## 2024-07-12 PROCEDURE — 72125 CT NECK SPINE W/O DYE: CPT

## 2024-07-12 PROCEDURE — 25010000002 DIAZEPAM PER 5 MG: Performed by: PHYSICIAN ASSISTANT

## 2024-07-12 PROCEDURE — 72131 CT LUMBAR SPINE W/O DYE: CPT

## 2024-07-12 PROCEDURE — 96375 TX/PRO/DX INJ NEW DRUG ADDON: CPT

## 2024-07-12 PROCEDURE — 99285 EMERGENCY DEPT VISIT HI MDM: CPT

## 2024-07-12 PROCEDURE — 70450 CT HEAD/BRAIN W/O DYE: CPT

## 2024-07-12 RX ORDER — ONDANSETRON 2 MG/ML
4 INJECTION INTRAMUSCULAR; INTRAVENOUS ONCE
Status: COMPLETED | OUTPATIENT
Start: 2024-07-12 | End: 2024-07-12

## 2024-07-12 RX ORDER — KETOROLAC TROMETHAMINE 30 MG/ML
30 INJECTION, SOLUTION INTRAMUSCULAR; INTRAVENOUS EVERY 6 HOURS PRN
Status: DISCONTINUED | OUTPATIENT
Start: 2024-07-12 | End: 2024-07-12 | Stop reason: HOSPADM

## 2024-07-12 RX ORDER — DIAZEPAM 5 MG/ML
2.5 INJECTION, SOLUTION INTRAMUSCULAR; INTRAVENOUS ONCE
Status: COMPLETED | OUTPATIENT
Start: 2024-07-12 | End: 2024-07-12

## 2024-07-12 RX ORDER — SODIUM CHLORIDE 0.9 % (FLUSH) 0.9 %
10 SYRINGE (ML) INJECTION AS NEEDED
Status: DISCONTINUED | OUTPATIENT
Start: 2024-07-12 | End: 2024-07-12 | Stop reason: HOSPADM

## 2024-07-12 RX ORDER — CYCLOBENZAPRINE HCL 10 MG
10 TABLET ORAL 3 TIMES DAILY PRN
Qty: 15 TABLET | Refills: 0 | Status: SHIPPED | OUTPATIENT
Start: 2024-07-12

## 2024-07-12 RX ORDER — FENTANYL CITRATE 50 UG/ML
50 INJECTION, SOLUTION INTRAMUSCULAR; INTRAVENOUS ONCE
Status: COMPLETED | OUTPATIENT
Start: 2024-07-12 | End: 2024-07-12

## 2024-07-12 RX ORDER — ONDANSETRON 4 MG/1
4 TABLET, ORALLY DISINTEGRATING ORAL EVERY 8 HOURS PRN
Qty: 6 TABLET | Refills: 0 | Status: SHIPPED | OUTPATIENT
Start: 2024-07-12 | End: 2024-07-14

## 2024-07-12 RX ORDER — OXYCODONE HYDROCHLORIDE AND ACETAMINOPHEN 5; 325 MG/1; MG/1
1 TABLET ORAL EVERY 6 HOURS PRN
Qty: 8 TABLET | Refills: 0 | Status: SHIPPED | OUTPATIENT
Start: 2024-07-12 | End: 2024-07-14

## 2024-07-12 RX ORDER — HYDROMORPHONE HYDROCHLORIDE 1 MG/ML
0.5 INJECTION, SOLUTION INTRAMUSCULAR; INTRAVENOUS; SUBCUTANEOUS
Status: DISCONTINUED | OUTPATIENT
Start: 2024-07-12 | End: 2024-07-12

## 2024-07-12 RX ORDER — OXYCODONE HYDROCHLORIDE AND ACETAMINOPHEN 5; 325 MG/1; MG/1
1 TABLET ORAL ONCE
Status: DISCONTINUED | OUTPATIENT
Start: 2024-07-12 | End: 2024-07-12 | Stop reason: HOSPADM

## 2024-07-12 RX ORDER — ONDANSETRON 2 MG/ML
INJECTION INTRAMUSCULAR; INTRAVENOUS
Status: COMPLETED
Start: 2024-07-12 | End: 2024-07-12

## 2024-07-12 RX ORDER — HYDROMORPHONE HYDROCHLORIDE 1 MG/ML
0.5 INJECTION, SOLUTION INTRAMUSCULAR; INTRAVENOUS; SUBCUTANEOUS ONCE
Status: COMPLETED | OUTPATIENT
Start: 2024-07-12 | End: 2024-07-12

## 2024-07-12 RX ADMIN — FENTANYL CITRATE 50 MCG: 0.05 INJECTION, SOLUTION INTRAMUSCULAR; INTRAVENOUS at 12:54

## 2024-07-12 RX ADMIN — KETOROLAC TROMETHAMINE 30 MG: 30 INJECTION, SOLUTION INTRAMUSCULAR at 17:29

## 2024-07-12 RX ADMIN — DIAZEPAM 2.5 MG: 10 INJECTION, SOLUTION INTRAMUSCULAR; INTRAVENOUS at 15:37

## 2024-07-12 RX ADMIN — ONDANSETRON 4 MG: 2 INJECTION INTRAMUSCULAR; INTRAVENOUS at 15:41

## 2024-07-12 RX ADMIN — IOPAMIDOL 95 ML: 612 INJECTION, SOLUTION INTRAVENOUS at 14:28

## 2024-07-12 RX ADMIN — HYDROMORPHONE HYDROCHLORIDE 0.5 MG: 1 INJECTION, SOLUTION INTRAMUSCULAR; INTRAVENOUS; SUBCUTANEOUS at 13:55

## 2024-07-12 NOTE — DISCHARGE INSTRUCTIONS
You be discharged home with a hard cervical collar.  Wear this collar at all times except with bathing.  Take pain medication as prescribed.  Follow-up with neurosurgery.  Return to emergency department/neurosurgery if any weakness, paresthesias, tingling.

## 2024-07-12 NOTE — [facility] PROVIDER NOTE
"Subjective  History of Present Illness:    Patient is a 53-year-old female presenting to the emergency department with back pain.  She reportedly was bucked off of a horse, landing on the ground flat on her back.  She denies head injury or loss of consciousness.  She complains of pain in her upper mid back and right hip.  She denies weakness, numbness, tingling, changes in bowel or bladder control.  Patient ambulated following injury.      Nurses Notes reviewed and agree, including vitals, allergies, social history and prior medical history.     REVIEW OF SYSTEMS: All systems reviewed and not pertinent unless noted.  Review of Systems   Musculoskeletal:  Positive for back pain and neck pain.        Right hip pain   All other systems reviewed and are negative.      No past medical history on file.    Allergies:    Droperidol and Sulfa antibiotics      No past surgical history on file.      Social History     Socioeconomic History    Marital status:          No family history on file.    Objective  Physical Exam:  /94   Pulse 86   Temp 98.6 °F (37 °C)   Resp 20   Ht 172.7 cm (68\")   Wt 72.6 kg (160 lb)   SpO2 99%   BMI 24.33 kg/m²      Physical Exam  Vitals and nursing note reviewed.   Constitutional:       Appearance: Normal appearance. She is normal weight.   HENT:      Head: Normocephalic and atraumatic.   Eyes:      Pupils: Pupils are equal, round, and reactive to light.   Cardiovascular:      Rate and Rhythm: Normal rate.   Pulmonary:      Effort: Pulmonary effort is normal.      Breath sounds: Normal breath sounds.   Abdominal:      Palpations: Abdomen is soft.      Tenderness: There is no abdominal tenderness.   Musculoskeletal:         General: Tenderness present.      Cervical back: Tenderness present.      Right hip: Bony tenderness present. No deformity.   Skin:     General: Skin is warm and dry.   Neurological:      General: No focal deficit present.      Mental Status: She is alert and " oriented to person, place, and time. Mental status is at baseline.      Cranial Nerves: No cranial nerve deficit.      Sensory: Sensation is intact.      Motor: No weakness.      Gait: Gait is intact.      Comments: Equal  strength bilaterally.    Psychiatric:         Mood and Affect: Mood normal.         Behavior: Behavior normal.         Thought Content: Thought content normal.         Judgment: Judgment normal.         Procedures    ED Course:     Patient presented with neck and back pain after falling from a horse.  CT imaging of the spine reveals a C6 vertebral body fracture and a transverse process fracture at L3.  CT imaging of the head, chest, abdomen, pelvis was negative for acute injuries.  Discussed patient with neurosurgery, Dr. Mcgarry's PA Cookie. They reviewed images.  Was advised patient to be discharged home in a rigid [place] c-collar.  Patient will be advised to wear the collar for at least the next 2 months and can follow-up outpatient in the next couple of weeks.  [facility] was called and will come to the emergency department to fit the patient today.  Patient in a rigid prefabricated cervical hard collar.  Patient suffered a C6 vertebral body fracture after being thrown from a horse.  She will wear the c-collar at all times for the next 8 weeks .  She is allowed to remove it for 30 minutes in the morning for hygiene and eating purposes.     Lab Results (last 24 hours)       Procedure Component Value Units Date/Time    CBC & Differential [[id number]]  (Abnormal) Collected: 07/12/24 1257    Specimen: Blood Updated: 07/12/24 1303    Narrative:      The following orders were created for panel order CBC & Differential.  Procedure                               Abnormality         Status                     ---------                               -----------         ------                     CBC Auto Differential[[id number]]        Abnormal            Final result                 Please  "view results for these tests on the individual orders.    Comprehensive Metabolic Panel [[id number]]  (Abnormal) Collected: 07/12/24 1257    Specimen: Blood Updated: 07/12/24 1319     Glucose 100 mg/dL      BUN 18 mg/dL      Creatinine 0.99 mg/dL      Sodium 140 mmol/L      Potassium 4.4 mmol/L      Chloride 107 mmol/L      CO2 24.3 mmol/L      Calcium 8.8 mg/dL      Total Protein 6.0 g/dL      Albumin 4.0 g/dL      ALT (SGPT) 31 U/L      AST (SGOT) 34 U/L      Alkaline Phosphatase 48 U/L      Total Bilirubin 0.3 mg/dL      Globulin 2.0 gm/dL      A/G Ratio 2.0 g/dL      BUN/Creatinine Ratio 18.2     Anion Gap 8.7 mmol/L      eGFR 68.7 mL/min/1.73     Narrative:      GFR Normal >60  Chronic Kidney Disease <60  Kidney Failure <15      CBC Auto Differential [[id number]]  (Abnormal) Collected: 07/12/24 1257    Specimen: Blood Updated: 07/12/24 1303     WBC 11.23 10*3/mm3      RBC 4.36 10*6/mm3      Hemoglobin 13.7 g/dL      Hematocrit 40.0 %      MCV 91.7 fL      MCH 31.4 pg      MCHC 34.3 g/dL      RDW 12.2 %      RDW-SD 41.9 fl      MPV 9.6 fL      Platelets 221 10*3/mm3      Neutrophil % 83.8 %      Lymphocyte % 7.5 %      Monocyte % 8.2 %      Eosinophil % 0.0 %      Basophil % 0.1 %      Immature Grans % 0.4 %      Neutrophils, Absolute 9.41 10*3/mm3      Lymphocytes, Absolute 0.84 10*3/mm3      Monocytes, Absolute 0.92 10*3/mm3      Eosinophils, Absolute 0.00 10*3/mm3      Basophils, Absolute 0.01 10*3/mm3      Immature Grans, Absolute 0.05 10*3/mm3              CT Head Without Contrast    Addendum Date: 7/12/2024    ADDENDUM #1 ADDENDUM: The body of the dictation on the CT scan of the chest contains a transcription error in the third paragraph which states \"Vertical fracture of C2.... \". Vertical fracture is actually of the C6 vertebral body, as is correctly described in the IMPRESSION of the report, and on the patient's cervical spine CT scan report. Electronically Signed: Ed Alejandro MD  7/12/2024 3:29 PM EDT  " Workstation ID: [id number] ORIGINAL REPORT: CT HEAD WO CONTRAST, CT CHEST W CONTRAST DIAGNOSTIC, CT ABDOMEN PELVIS W CONTRAST Date of Exam: 7/12/2024 2:16 PM EDT Indication: trauma. Comparison: Outside CT scan of the chest 5/9/2024 Technique: Axial CT images were obtained of the head without contrast administration. Subsequent post IV contrast axial images were obtained of the chest, abdomen and pelvis, following administration of 95 mL of Isovue-300. Sagittal and coronal reconstructions were obtained. Automated exposure control and iterative construction methods were used. Findings: History indicates patient was thrown from horse. No additional clinical history is currently available. CT SCAN OF THE HEAD WITHOUT CONTRAST: The calvarium appears intact. Paranasal sinuses and mastoids appear clear. No gross abnormalities are seen in the orbits. The brain appears within normal limits. There is no evidence of hemorrhage, contusion, or edema, no evidence of mass or mass effect, infarct, hydrocephalus, or abnormal extra-axial collection. IMPRESSION: No evidence of acute trauma to the brain or other acute intracranial disease. CT SCAN OF THE CHEST WITH IV CONTRAST. Allowing for the usual pulsation artifact at the aortic root, vascular appears normal. Pulmonary arteries are well contrast opacified and appear normal. No pericardial or pleural effusion, mediastinal mass adenopathy or hematoma is seen. There is a small hiatal hernia. Airways appear to be normally patent. Lungs appear normally expanded and clear. There is no evidence of pneumothorax. No chest wall hematoma or other superficial injury is identified. Vertical fracture of C2 is included at the far superior margin of the sagittal images. Thoracic and included lumbar vertebral body appear intact and normally aligned. There is no evidence of sternal fracture. No rib fracture is identified. IMPRESSION: 1. No evidence of acute trauma to the chest or other acute chest  disease. 2. Incidentally noted fracture of C6. Cervical spine CT scan is reported separately. Please see that report. CT SCAN OF THE ABDOMEN PELVIS WITH IV CONTRAST: Liver appears unremarkable except for diffuse fatty change. Spleen is normal in size and intact. No significant abnormalities are appreciated of the gallbladder, pancreas, adrenal glands, or kidneys. No upper abdominal free air, ascites, adenopathy, or acute inflammatory change is seen. Abdominal aorta and mesenteric vessels opacify normally with contrast. Bowel loops are normal in caliber. Regarding the lower abdomen/pelvis, no intra-abdominal free fluid, adenopathy or inflammatory change is seen. There may be mild fecal stasis, but no evidence of obstruction. Uterus is premenopausal in size. Ovaries appear mildly atrophic, appropriate for  age. Bladder is intact, moderately distended. Iliac and included superficial femoral vessels appear normal. No significant superficial soft tissue trauma is seen. Linear scarring transversely in the anterior lower pelvis may reflect previous  rather than hematoma. Delayed venous phase images show no evidence of obstructive uropathy or contrast extravasation. Bony structures appear to be intact. Impression: No evidence of acute trauma to the abdomen or pelvis. Electronically Signed: Ed Alejandro MD  2024 3:07 PM EDT  Workstation ID: [id number]    Result Date: 2024  CT HEAD WO CONTRAST, CT CHEST W CONTRAST DIAGNOSTIC, CT ABDOMEN PELVIS W CONTRAST Date of Exam: 2024 2:16 PM EDT Indication: trauma. Comparison: Outside CT scan of the chest 2024 Technique: Axial CT images were obtained of the head without contrast administration. Subsequent post IV contrast axial images were obtained of the chest, abdomen and pelvis, following administration of 95 mL of Isovue-300. Sagittal and coronal reconstructions were obtained. Automated exposure control and iterative construction methods were used. Findings:  History indicates patient was thrown from horse. No additional clinical history is currently available. CT SCAN OF THE HEAD WITHOUT CONTRAST: The calvarium appears intact. Paranasal sinuses and mastoids appear clear. No gross abnormalities are seen in the orbits. The brain appears within normal limits. There is no evidence of hemorrhage, contusion, or edema, no evidence of mass or mass effect, infarct, hydrocephalus, or abnormal extra-axial collection.     Impression: No evidence of acute trauma to the brain or other acute intracranial disease. CT SCAN OF THE CHEST WITH IV CONTRAST. Allowing for the usual pulsation artifact at the aortic root, vascular appears normal. Pulmonary arteries are well contrast opacified and appear normal. No pericardial or pleural effusion, mediastinal mass adenopathy or hematoma is seen. There is a small hiatal hernia. Airways appear to be normally patent. Lungs appear normally expanded and clear. There is no evidence of pneumothorax. No chest wall hematoma or other superficial injury is identified. Vertical fracture of C2 is included at the far superior margin of the sagittal images. Thoracic and included lumbar vertebral body appear intact and normally aligned. There is no evidence of sternal fracture. No rib fracture is identified. IMPRESSION: 1. No evidence of acute trauma to the chest or other acute chest disease. 2. Incidentally noted fracture of C6. Cervical spine CT scan is reported separately. Please see that report. CT SCAN OF THE ABDOMEN PELVIS WITH IV CONTRAST: Liver appears unremarkable except for diffuse fatty change. Spleen is normal in size and intact. No significant abnormalities are appreciated of the gallbladder, pancreas, adrenal glands, or kidneys. No upper abdominal free air, ascites, adenopathy, or acute inflammatory change is seen. Abdominal aorta and mesenteric vessels opacify normally with contrast. Bowel loops are normal in caliber. Regarding the lower  "abdomen/pelvis, no intra-abdominal free fluid, adenopathy or inflammatory change is seen. There may be mild fecal stasis, but no evidence of obstruction. Uterus is premenopausal in size. Ovaries appear mildly atrophic, appropriate for  age. Bladder is intact, moderately distended. Iliac and included superficial femoral vessels appear normal. No significant superficial soft tissue trauma is seen. Linear scarring transversely in the anterior lower pelvis may reflect previous  rather than hematoma. Delayed venous phase images show no evidence of obstructive uropathy or contrast extravasation. Bony structures appear to be intact. Impression: No evidence of acute trauma to the abdomen or pelvis. Electronically Signed: Ed Alejandro MD  2024 3:07 PM EDT  Workstation ID: [id number]    CT Chest With Contrast Diagnostic    Addendum Date: 2024    ADDENDUM #1 ADDENDUM: The body of the dictation on the CT scan of the chest contains a transcription error in the third paragraph which states \"Vertical fracture of C2.... \". Vertical fracture is actually of the C6 vertebral body, as is correctly described in the IMPRESSION of the report, and on the patient's cervical spine CT scan report. Electronically Signed: Ed Alejandro MD  2024 3:29 PM EDT  Workstation ID: [id number] ORIGINAL REPORT: CT HEAD WO CONTRAST, CT CHEST W CONTRAST DIAGNOSTIC, CT ABDOMEN PELVIS W CONTRAST Date of Exam: 2024 2:16 PM EDT Indication: trauma. Comparison: Outside CT scan of the chest 2024 Technique: Axial CT images were obtained of the head without contrast administration. Subsequent post IV contrast axial images were obtained of the chest, abdomen and pelvis, following administration of 95 mL of Isovue-300. Sagittal and coronal reconstructions were obtained. Automated exposure control and iterative construction methods were used. Findings: History indicates patient was thrown from horse. No additional clinical history is currently " available. CT SCAN OF THE HEAD WITHOUT CONTRAST: The calvarium appears intact. Paranasal sinuses and mastoids appear clear. No gross abnormalities are seen in the orbits. The brain appears within normal limits. There is no evidence of hemorrhage, contusion, or edema, no evidence of mass or mass effect, infarct, hydrocephalus, or abnormal extra-axial collection. IMPRESSION: No evidence of acute trauma to the brain or other acute intracranial disease. CT SCAN OF THE CHEST WITH IV CONTRAST. Allowing for the usual pulsation artifact at the aortic root, vascular appears normal. Pulmonary arteries are well contrast opacified and appear normal. No pericardial or pleural effusion, mediastinal mass adenopathy or hematoma is seen. There is a small hiatal hernia. Airways appear to be normally patent. Lungs appear normally expanded and clear. There is no evidence of pneumothorax. No chest wall hematoma or other superficial injury is identified. Vertical fracture of C2 is included at the far superior margin of the sagittal images. Thoracic and included lumbar vertebral body appear intact and normally aligned. There is no evidence of sternal fracture. No rib fracture is identified. IMPRESSION: 1. No evidence of acute trauma to the chest or other acute chest disease. 2. Incidentally noted fracture of C6. Cervical spine CT scan is reported separately. Please see that report. CT SCAN OF THE ABDOMEN PELVIS WITH IV CONTRAST: Liver appears unremarkable except for diffuse fatty change. Spleen is normal in size and intact. No significant abnormalities are appreciated of the gallbladder, pancreas, adrenal glands, or kidneys. No upper abdominal free air, ascites, adenopathy, or acute inflammatory change is seen. Abdominal aorta and mesenteric vessels opacify normally with contrast. Bowel loops are normal in caliber. Regarding the lower abdomen/pelvis, no intra-abdominal free fluid, adenopathy or inflammatory change is seen. There may be  mild fecal stasis, but no evidence of obstruction. Uterus is premenopausal in size. Ovaries appear mildly atrophic, appropriate for  age. Bladder is intact, moderately distended. Iliac and included superficial femoral vessels appear normal. No significant superficial soft tissue trauma is seen. Linear scarring transversely in the anterior lower pelvis may reflect previous  rather than hematoma. Delayed venous phase images show no evidence of obstructive uropathy or contrast extravasation. Bony structures appear to be intact. Impression: No evidence of acute trauma to the abdomen or pelvis. Electronically Signed: Ed Alejandro MD  2024 3:07 PM EDT  Workstation ID: [id number]    Result Date: 2024  CT HEAD WO CONTRAST, CT CHEST W CONTRAST DIAGNOSTIC, CT ABDOMEN PELVIS W CONTRAST Date of Exam: 2024 2:16 PM EDT Indication: trauma. Comparison: Outside CT scan of the chest 2024 Technique: Axial CT images were obtained of the head without contrast administration. Subsequent post IV contrast axial images were obtained of the chest, abdomen and pelvis, following administration of 95 mL of Isovue-300. Sagittal and coronal reconstructions were obtained. Automated exposure control and iterative construction methods were used. Findings: History indicates patient was thrown from horse. No additional clinical history is currently available. CT SCAN OF THE HEAD WITHOUT CONTRAST: The calvarium appears intact. Paranasal sinuses and mastoids appear clear. No gross abnormalities are seen in the orbits. The brain appears within normal limits. There is no evidence of hemorrhage, contusion, or edema, no evidence of mass or mass effect, infarct, hydrocephalus, or abnormal extra-axial collection.     Impression: No evidence of acute trauma to the brain or other acute intracranial disease. CT SCAN OF THE CHEST WITH IV CONTRAST. Allowing for the usual pulsation artifact at the aortic root, vascular appears normal.  Pulmonary arteries are well contrast opacified and appear normal. No pericardial or pleural effusion, mediastinal mass adenopathy or hematoma is seen. There is a small hiatal hernia. Airways appear to be normally patent. Lungs appear normally expanded and clear. There is no evidence of pneumothorax. No chest wall hematoma or other superficial injury is identified. Vertical fracture of C2 is included at the far superior margin of the sagittal images. Thoracic and included lumbar vertebral body appear intact and normally aligned. There is no evidence of sternal fracture. No rib fracture is identified. IMPRESSION: 1. No evidence of acute trauma to the chest or other acute chest disease. 2. Incidentally noted fracture of C6. Cervical spine CT scan is reported separately. Please see that report. CT SCAN OF THE ABDOMEN PELVIS WITH IV CONTRAST: Liver appears unremarkable except for diffuse fatty change. Spleen is normal in size and intact. No significant abnormalities are appreciated of the gallbladder, pancreas, adrenal glands, or kidneys. No upper abdominal free air, ascites, adenopathy, or acute inflammatory change is seen. Abdominal aorta and mesenteric vessels opacify normally with contrast. Bowel loops are normal in caliber. Regarding the lower abdomen/pelvis, no intra-abdominal free fluid, adenopathy or inflammatory change is seen. There may be mild fecal stasis, but no evidence of obstruction. Uterus is premenopausal in size. Ovaries appear mildly atrophic, appropriate for  age. Bladder is intact, moderately distended. Iliac and included superficial femoral vessels appear normal. No significant superficial soft tissue trauma is seen. Linear scarring transversely in the anterior lower pelvis may reflect previous  rather than hematoma. Delayed venous phase images show no evidence of obstructive uropathy or contrast extravasation. Bony structures appear to be intact. Impression: No evidence of acute trauma to  "the abdomen or pelvis. Electronically Signed: Ed Alejandro MD  7/12/2024 3:07 PM EDT  Workstation ID: [id number]    CT Abdomen Pelvis With Contrast    Addendum Date: 7/12/2024    ADDENDUM #1 ADDENDUM: The body of the dictation on the CT scan of the chest contains a transcription error in the third paragraph which states \"Vertical fracture of C2.... \". Vertical fracture is actually of the C6 vertebral body, as is correctly described in the IMPRESSION of the report, and on the patient's cervical spine CT scan report. Electronically Signed: Ed Alejandro MD  7/12/2024 3:29 PM EDT  Workstation ID: [id number] ORIGINAL REPORT: CT HEAD WO CONTRAST, CT CHEST W CONTRAST DIAGNOSTIC, CT ABDOMEN PELVIS W CONTRAST Date of Exam: 7/12/2024 2:16 PM EDT Indication: trauma. Comparison: Outside CT scan of the chest 5/9/2024 Technique: Axial CT images were obtained of the head without contrast administration. Subsequent post IV contrast axial images were obtained of the chest, abdomen and pelvis, following administration of 95 mL of Isovue-300. Sagittal and coronal reconstructions were obtained. Automated exposure control and iterative construction methods were used. Findings: History indicates patient was thrown from horse. No additional clinical history is currently available. CT SCAN OF THE HEAD WITHOUT CONTRAST: The calvarium appears intact. Paranasal sinuses and mastoids appear clear. No gross abnormalities are seen in the orbits. The brain appears within normal limits. There is no evidence of hemorrhage, contusion, or edema, no evidence of mass or mass effect, infarct, hydrocephalus, or abnormal extra-axial collection. IMPRESSION: No evidence of acute trauma to the brain or other acute intracranial disease. CT SCAN OF THE CHEST WITH IV CONTRAST. Allowing for the usual pulsation artifact at the aortic root, vascular appears normal. Pulmonary arteries are well contrast opacified and appear normal. No pericardial or pleural effusion, " mediastinal mass adenopathy or hematoma is seen. There is a small hiatal hernia. Airways appear to be normally patent. Lungs appear normally expanded and clear. There is no evidence of pneumothorax. No chest wall hematoma or other superficial injury is identified. Vertical fracture of C2 is included at the far superior margin of the sagittal images. Thoracic and included lumbar vertebral body appear intact and normally aligned. There is no evidence of sternal fracture. No rib fracture is identified. IMPRESSION: 1. No evidence of acute trauma to the chest or other acute chest disease. 2. Incidentally noted fracture of C6. Cervical spine CT scan is reported separately. Please see that report. CT SCAN OF THE ABDOMEN PELVIS WITH IV CONTRAST: Liver appears unremarkable except for diffuse fatty change. Spleen is normal in size and intact. No significant abnormalities are appreciated of the gallbladder, pancreas, adrenal glands, or kidneys. No upper abdominal free air, ascites, adenopathy, or acute inflammatory change is seen. Abdominal aorta and mesenteric vessels opacify normally with contrast. Bowel loops are normal in caliber. Regarding the lower abdomen/pelvis, no intra-abdominal free fluid, adenopathy or inflammatory change is seen. There may be mild fecal stasis, but no evidence of obstruction. Uterus is premenopausal in size. Ovaries appear mildly atrophic, appropriate for  age. Bladder is intact, moderately distended. Iliac and included superficial femoral vessels appear normal. No significant superficial soft tissue trauma is seen. Linear scarring transversely in the anterior lower pelvis may reflect previous  rather than hematoma. Delayed venous phase images show no evidence of obstructive uropathy or contrast extravasation. Bony structures appear to be intact. Impression: No evidence of acute trauma to the abdomen or pelvis. Electronically Signed: Ed Alejandro MD  2024 3:07 PM EDT  Workstation ID:  [id number]    Result Date: 7/12/2024  CT HEAD WO CONTRAST, CT CHEST W CONTRAST DIAGNOSTIC, CT ABDOMEN PELVIS W CONTRAST Date of Exam: 7/12/2024 2:16 PM EDT Indication: trauma. Comparison: Outside CT scan of the chest 5/9/2024 Technique: Axial CT images were obtained of the head without contrast administration. Subsequent post IV contrast axial images were obtained of the chest, abdomen and pelvis, following administration of 95 mL of Isovue-300. Sagittal and coronal reconstructions were obtained. Automated exposure control and iterative construction methods were used. Findings: History indicates patient was thrown from horse. No additional clinical history is currently available. CT SCAN OF THE HEAD WITHOUT CONTRAST: The calvarium appears intact. Paranasal sinuses and mastoids appear clear. No gross abnormalities are seen in the orbits. The brain appears within normal limits. There is no evidence of hemorrhage, contusion, or edema, no evidence of mass or mass effect, infarct, hydrocephalus, or abnormal extra-axial collection.     Impression: No evidence of acute trauma to the brain or other acute intracranial disease. CT SCAN OF THE CHEST WITH IV CONTRAST. Allowing for the usual pulsation artifact at the aortic root, vascular appears normal. Pulmonary arteries are well contrast opacified and appear normal. No pericardial or pleural effusion, mediastinal mass adenopathy or hematoma is seen. There is a small hiatal hernia. Airways appear to be normally patent. Lungs appear normally expanded and clear. There is no evidence of pneumothorax. No chest wall hematoma or other superficial injury is identified. Vertical fracture of C2 is included at the far superior margin of the sagittal images. Thoracic and included lumbar vertebral body appear intact and normally aligned. There is no evidence of sternal fracture. No rib fracture is identified. IMPRESSION: 1. No evidence of acute trauma to the chest or other acute chest  disease. 2. Incidentally noted fracture of C6. Cervical spine CT scan is reported separately. Please see that report. CT SCAN OF THE ABDOMEN PELVIS WITH IV CONTRAST: Liver appears unremarkable except for diffuse fatty change. Spleen is normal in size and intact. No significant abnormalities are appreciated of the gallbladder, pancreas, adrenal glands, or kidneys. No upper abdominal free air, ascites, adenopathy, or acute inflammatory change is seen. Abdominal aorta and mesenteric vessels opacify normally with contrast. Bowel loops are normal in caliber. Regarding the lower abdomen/pelvis, no intra-abdominal free fluid, adenopathy or inflammatory change is seen. There may be mild fecal stasis, but no evidence of obstruction. Uterus is premenopausal in size. Ovaries appear mildly atrophic, appropriate for  age. Bladder is intact, moderately distended. Iliac and included superficial femoral vessels appear normal. No significant superficial soft tissue trauma is seen. Linear scarring transversely in the anterior lower pelvis may reflect previous  rather than hematoma. Delayed venous phase images show no evidence of obstructive uropathy or contrast extravasation. Bony structures appear to be intact. Impression: No evidence of acute trauma to the abdomen or pelvis. Electronically Signed: Ed Alejandro MD  2024 3:07 PM EDT  Workstation ID: [id number]    CT Cervical Spine Without Contrast    Result Date: 2024  CT CERVICAL SPINE WO CONTRAST, CT LUMBAR SPINE WO CONTRAST, CT THORACIC SPINE WO CONTRAST Date of Exam: 2024 2:16 PM EDT Indication: trauma. Comparison: None available. Technique: Axial CT images were obtained of the cervical, thoracic and lumbar spine without contrast administration.  Reconstructed coronal and sagittal images were also obtained. Automated exposure control and iterative construction methods were used. Findings: Cervical spine: There is an acute comminuted vertebral body fracture  present at C6, with somewhat oblique orientation suggesting possible flexion mechanism and/or axial load. There is no evidence of posterior element involvement or associated traumatic malalignment. Mild straightening is noted without listhesis or subluxation. The paraspinal soft tissues demonstrate no acute or suspicious findings. Thoracic spine: Thoracic vertebral body heights are maintained. Cortical margins are intact and there is no evidence of acute fracture or traumatic malalignment. Mild spondylosis changes are present with some small areas of thoracic osteophyte formation.  The paraspinal soft tissues demonstrate no acute or suspicious findings. Lumbar spine: Cortical margins are intact and vertebral body heights are maintained. There is an acute mildly displaced fracture of the right L3 transverse process. Alignment remains anatomic. Mild spondylosis changes are apparent with some facet arthropathy noted at the lower lumbar levels. The paraspinal soft tissues demonstrate no acute or suspicious findings.     Impression: Impression: Somewhat irregular comminuted and oblique fracture of the C6 vertebral body suggesting at least partial component of flexion mechanism injury. There is no definite associated traumatic malalignment and vertebral body height loss is minimal. No acute fracture or traumatic malalignment of the thoracic spine. Mildly displaced fracture of the right L3 transverse process. No additional acute fracture or traumatic malalignment of the lumbar spine. Electronically Signed: Jose Torres MD  7/12/2024 3:01 PM EDT  Workstation ID: [id number]    CT Thoracic Spine Without Contrast    Result Date: 7/12/2024  CT CERVICAL SPINE WO CONTRAST, CT LUMBAR SPINE WO CONTRAST, CT THORACIC SPINE WO CONTRAST Date of Exam: 7/12/2024 2:16 PM EDT Indication: trauma. Comparison: None available. Technique: Axial CT images were obtained of the cervical, thoracic and lumbar spine without contrast administration.   Reconstructed coronal and sagittal images were also obtained. Automated exposure control and iterative construction methods were used. Findings: Cervical spine: There is an acute comminuted vertebral body fracture present at C6, with somewhat oblique orientation suggesting possible flexion mechanism and/or axial load. There is no evidence of posterior element involvement or associated traumatic malalignment. Mild straightening is noted without listhesis or subluxation. The paraspinal soft tissues demonstrate no acute or suspicious findings. Thoracic spine: Thoracic vertebral body heights are maintained. Cortical margins are intact and there is no evidence of acute fracture or traumatic malalignment. Mild spondylosis changes are present with some small areas of thoracic osteophyte formation.  The paraspinal soft tissues demonstrate no acute or suspicious findings. Lumbar spine: Cortical margins are intact and vertebral body heights are maintained. There is an acute mildly displaced fracture of the right L3 transverse process. Alignment remains anatomic. Mild spondylosis changes are apparent with some facet arthropathy noted at the lower lumbar levels. The paraspinal soft tissues demonstrate no acute or suspicious findings.     Impression: Impression: Somewhat irregular comminuted and oblique fracture of the C6 vertebral body suggesting at least partial component of flexion mechanism injury. There is no definite associated traumatic malalignment and vertebral body height loss is minimal. No acute fracture or traumatic malalignment of the thoracic spine. Mildly displaced fracture of the right L3 transverse process. No additional acute fracture or traumatic malalignment of the lumbar spine. Electronically Signed: Jose Torres MD  7/12/2024 3:01 PM EDT  Workstation ID: [id number]    CT Lumbar Spine Without Contrast    Result Date: 7/12/2024  CT CERVICAL SPINE WO CONTRAST, CT LUMBAR SPINE WO CONTRAST, CT THORACIC SPINE WO  CONTRAST Date of Exam: 7/12/2024 2:16 PM EDT Indication: trauma. Comparison: None available. Technique: Axial CT images were obtained of the cervical, thoracic and lumbar spine without contrast administration.  Reconstructed coronal and sagittal images were also obtained. Automated exposure control and iterative construction methods were used. Findings: Cervical spine: There is an acute comminuted vertebral body fracture present at C6, with somewhat oblique orientation suggesting possible flexion mechanism and/or axial load. There is no evidence of posterior element involvement or associated traumatic malalignment. Mild straightening is noted without listhesis or subluxation. The paraspinal soft tissues demonstrate no acute or suspicious findings. Thoracic spine: Thoracic vertebral body heights are maintained. Cortical margins are intact and there is no evidence of acute fracture or traumatic malalignment. Mild spondylosis changes are present with some small areas of thoracic osteophyte formation.  The paraspinal soft tissues demonstrate no acute or suspicious findings. Lumbar spine: Cortical margins are intact and vertebral body heights are maintained. There is an acute mildly displaced fracture of the right L3 transverse process. Alignment remains anatomic. Mild spondylosis changes are apparent with some facet arthropathy noted at the lower lumbar levels. The paraspinal soft tissues demonstrate no acute or suspicious findings.     Impression: Impression: Somewhat irregular comminuted and oblique fracture of the C6 vertebral body suggesting at least partial component of flexion mechanism injury. There is no definite associated traumatic malalignment and vertebral body height loss is minimal. No acute fracture or traumatic malalignment of the thoracic spine. Mildly displaced fracture of the right L3 transverse process. No additional acute fracture or traumatic malalignment of the lumbar spine. Electronically Signed:  Jose Torres MD  7/12/2024 3:01 PM EDT  Workstation ID: [id number]        [facility]     Amount and/or Complexity of Data Reviewed  Clinical lab tests: reviewed  Tests in the radiology section of CPT®: reviewed          DDX: includes but is not limited to: Cervical fracture, lumbar fracture, rib fractures, polytrauma    Patient arrives POV with vitals interpreted by myself.     Pertinent features from physical exam: Patient has significant pain to upper mid back, right hip.  Neurovascularly intact      Interventions / Re-evaluation: Patient's pain did improve following Valium    Medications   fentaNYL citrate (PF) (SUBLIMAZE) injection 50 mcg (50 mcg Intravenous Given 7/12/24 1254)   HYDROmorphone (DILAUDID) injection 0.5 mg (0.5 mg Intravenous Given 7/12/24 1355)   iopamidol (ISOVUE-300) 61 % injection 100 mL (95 mL Intravenous Given 7/12/24 1428)   diazePAM (VALIUM) injection 2.5 mg (2.5 mg Intravenous Given 7/12/24 1537)   ondansetron (ZOFRAN) injection 4 mg (4 mg Intravenous Given 7/12/24 1541)       Results/clinical rationale were discussed with patient,     Consultations/Discussion of results with other physicians: Dr. Danielle/ GIORGI Gary with neurosurgery    -----  ED Disposition       ED Disposition   Discharge    Condition   Stable    Comment   --             Final diagnoses:   Closed fracture of cervical vertebral body   Fall from horse, initial encounter   Closed fracture of transverse process of lumbar vertebra, initial encounter      Your Follow-Up Providers       Jaquan Danielle MD In 1 week.    Specialty: Neurosurgery  Follow up details: recheck of symptoms  [address] [id number] [phone]                       Contact information for after-discharge care    Follow-up information has not been specified.                    Your medication list        START taking these medications        Instructions Last Dose Given Next Dose Due   cyclobenzaprine 10 MG  tablet  Commonly known as: FLEXERIL      Take 1 tablet by mouth 3 (Three) Times a Day As Needed for Muscle Spasms.       ondansetron ODT 4 MG disintegrating tablet  Commonly known as: ZOFRAN-ODT      Take 1 tablet by mouth Every 8 (Eight) Hours As Needed for Nausea or Vomiting for up to 2 days.       oxyCODONE-acetaminophen 5-325 MG per tablet  Commonly known as: PERCOCET      Take 1 tablet by mouth Every 6 (Six) Hours As Needed for Severe Pain or Moderate Pain for up to 2 days.                 Where to Get Your Medications        These medications were sent to [id number] - [place], [place] - [address] [place] [address] AT [facility] - [phone]  - [phone] [address] [address]      Phone: [phone]   cyclobenzaprine 10 MG tablet  ondansetron ODT 4 MG disintegrating tablet  oxyCODONE-acetaminophen 5-325 MG per tablet

## 2024-08-28 ENCOUNTER — OFFICE VISIT (OUTPATIENT)
Dept: PULMONOLOGY | Facility: CLINIC | Age: 54
End: 2024-08-28
Payer: OTHER GOVERNMENT

## 2024-08-28 VITALS
DIASTOLIC BLOOD PRESSURE: 76 MMHG | OXYGEN SATURATION: 96 % | TEMPERATURE: 97.8 F | HEART RATE: 68 BPM | HEIGHT: 68 IN | BODY MASS INDEX: 20.92 KG/M2 | SYSTOLIC BLOOD PRESSURE: 130 MMHG | WEIGHT: 138 LBS

## 2024-08-28 DIAGNOSIS — J45.50 SEVERE PERSISTENT ASTHMA WITHOUT COMPLICATION: Primary | ICD-10-CM

## 2024-08-28 LAB
BASOPHILS # BLD AUTO: 0.02 10*3/MM3 (ref 0–0.2)
BASOPHILS NFR BLD AUTO: 0.3 % (ref 0–1.5)
DEPRECATED RDW RBC AUTO: 41.3 FL (ref 37–54)
EOSINOPHIL # BLD AUTO: 0.17 10*3/MM3 (ref 0–0.4)
EOSINOPHIL NFR BLD AUTO: 3 % (ref 0.3–6.2)
ERYTHROCYTE [DISTWIDTH] IN BLOOD BY AUTOMATED COUNT: 11.9 % (ref 12.3–15.4)
HCT VFR BLD AUTO: 42.1 % (ref 34–46.6)
HGB BLD-MCNC: 14 G/DL (ref 12–15.9)
IMM GRANULOCYTES # BLD AUTO: 0.02 10*3/MM3 (ref 0–0.05)
IMM GRANULOCYTES NFR BLD AUTO: 0.3 % (ref 0–0.5)
LYMPHOCYTES # BLD AUTO: 1 10*3/MM3 (ref 0.7–3.1)
LYMPHOCYTES NFR BLD AUTO: 17.4 % (ref 19.6–45.3)
MCH RBC QN AUTO: 31.5 PG (ref 26.6–33)
MCHC RBC AUTO-ENTMCNC: 33.3 G/DL (ref 31.5–35.7)
MCV RBC AUTO: 94.8 FL (ref 79–97)
MONOCYTES # BLD AUTO: 0.65 10*3/MM3 (ref 0.1–0.9)
MONOCYTES NFR BLD AUTO: 11.3 % (ref 5–12)
NEUTROPHILS NFR BLD AUTO: 3.89 10*3/MM3 (ref 1.7–7)
NEUTROPHILS NFR BLD AUTO: 67.7 % (ref 42.7–76)
NRBC BLD AUTO-RTO: 0 /100 WBC (ref 0–0.2)
PLATELET # BLD AUTO: 261 10*3/MM3 (ref 140–450)
PMV BLD AUTO: 9.9 FL (ref 6–12)
RBC # BLD AUTO: 4.44 10*6/MM3 (ref 3.77–5.28)
WBC NRBC COR # BLD AUTO: 5.75 10*3/MM3 (ref 3.4–10.8)

## 2024-08-28 PROCEDURE — 86003 ALLG SPEC IGE CRUDE XTRC EA: CPT | Performed by: INTERNAL MEDICINE

## 2024-08-28 PROCEDURE — 82785 ASSAY OF IGE: CPT | Performed by: INTERNAL MEDICINE

## 2024-08-28 PROCEDURE — 36415 COLL VENOUS BLD VENIPUNCTURE: CPT | Performed by: INTERNAL MEDICINE

## 2024-08-28 PROCEDURE — 85025 COMPLETE CBC W/AUTO DIFF WBC: CPT | Performed by: INTERNAL MEDICINE

## 2024-08-28 PROCEDURE — 94726 PLETHYSMOGRAPHY LUNG VOLUMES: CPT | Performed by: INTERNAL MEDICINE

## 2024-08-28 PROCEDURE — 94729 DIFFUSING CAPACITY: CPT | Performed by: INTERNAL MEDICINE

## 2024-08-28 PROCEDURE — 99205 OFFICE O/P NEW HI 60 MIN: CPT | Performed by: INTERNAL MEDICINE

## 2024-08-28 PROCEDURE — 94010 BREATHING CAPACITY TEST: CPT | Performed by: INTERNAL MEDICINE

## 2024-08-28 RX ORDER — ALBUTEROL SULFATE 90 UG/1
AEROSOL, METERED RESPIRATORY (INHALATION)
COMMUNITY

## 2024-08-28 RX ORDER — ALBUTEROL SULFATE AND BUDESONIDE 90; 80 UG/1; UG/1
2 AEROSOL, METERED RESPIRATORY (INHALATION) EVERY 6 HOURS PRN
Qty: 5.9 G | Refills: 11 | Status: SHIPPED | OUTPATIENT
Start: 2024-08-28

## 2024-08-28 RX ORDER — ZOLPIDEM TARTRATE 10 MG/1
TABLET ORAL
COMMUNITY
Start: 2024-04-29

## 2024-08-28 RX ORDER — BENRALIZUMAB 30 MG/ML
INJECTION, SOLUTION SUBCUTANEOUS
COMMUNITY
Start: 2024-05-13

## 2024-08-28 RX ORDER — MONTELUKAST SODIUM 10 MG/1
10 TABLET ORAL DAILY
COMMUNITY
Start: 2024-06-27

## 2024-08-28 RX ORDER — BUDESONIDE, GLYCOPYRROLATE, AND FORMOTEROL FUMARATE 160; 9; 4.8 UG/1; UG/1; UG/1
2 AEROSOL, METERED RESPIRATORY (INHALATION) 2 TIMES DAILY
COMMUNITY

## 2024-08-28 RX ORDER — METHOCARBAMOL 750 MG/1
TABLET, FILM COATED ORAL
COMMUNITY
Start: 2024-08-13

## 2024-08-28 RX ORDER — TRAMADOL HYDROCHLORIDE 50 MG/1
50 TABLET ORAL 2 TIMES DAILY
COMMUNITY

## 2024-08-28 NOTE — PROGRESS NOTES
Initial Pulmonary Consult Note    Subjective   Reason for consultation/Chief Complaint: Asthma    Aamir Langford is a 54 y.o. female is being seen for consultation today at the request of Maddy Oropeza MD    History of Present Illness    Ms. Langford is a 55yo F who is referred for asthma. She was diagnosed with asthma approximately 12-15 years ago. She was initially on Xolair and has currently been on Fasenra. She is on Breztri. Despite this regimen, she continues to have yearly exacerbations in the Spring which required prolonged courses of steroids.     Active Ambulatory Problems     Diagnosis Date Noted    No Active Ambulatory Problems     Resolved Ambulatory Problems     Diagnosis Date Noted    No Resolved Ambulatory Problems     No Additional Past Medical History       No past surgical history on file.    No family history on file.    Social History     Socioeconomic History    Marital status:    Tobacco Use    Smoking status: Never    Smokeless tobacco: Never   Vaping Use    Vaping status: Never Used   Substance and Sexual Activity    Alcohol use: Yes     Comment: social    Drug use: Never    Sexual activity: Defer       Allergies   Allergen Reactions    Droperidol Other (See Comments)     .    Sulfa Antibiotics Other (See Comments)     .       Current Outpatient Medications   Medication Sig Dispense Refill    albuterol sulfate  (90 Base) MCG/ACT inhaler inhale 1 puff by mouth every 4 hours as needed      Breztri Aerosphere 160-9-4.8 MCG/ACT aerosol inhaler Inhale 2 puffs 2 (Two) Times a Day.      cyclobenzaprine (FLEXERIL) 10 MG tablet Take 1 tablet by mouth 3 (Three) Times a Day As Needed for Muscle Spasms. 15 tablet 0    Fasenra 30 MG/ML solution prefilled syringe       fexofenadine ODT (ALLEGRA ODT) 30 MG disintegrating tablet Take 1 tablet by mouth Daily.      methocarbamol (ROBAXIN) 750 MG tablet TAKE 1 TABLET BY MOUTH EVERY 8 HOURS FOR 15 DAYS      montelukast (SINGULAIR) 10 MG tablet Take 1  "tablet by mouth Daily.      sertraline (ZOLOFT) 50 MG tablet Take 1 tablet by mouth Daily.      traMADol (ULTRAM) 50 MG tablet Take 1 tablet by mouth 2 (Two) Times a Day.      zolpidem (AMBIEN) 10 MG tablet       Albuterol-Budesonide (Airsupra) 90-80 MCG/ACT aerosol Inhale 2 puffs Every 6 (Six) Hours As Needed (Wheezing, shortness of breath). 5.9 g 11     No current facility-administered medications for this visit.       Review of Systems  All other systems were reviewed and are negative.  Exceptions are included in the HPI or as otherwise noted above.     Objective   Blood pressure 130/76, pulse 68, temperature 97.8 °F (36.6 °C), height 172.7 cm (68\"), weight 62.6 kg (138 lb), SpO2 96%.  Physical Exam  Vitals and nursing note reviewed.   Constitutional:       General: She is not in acute distress.     Appearance: She is well-developed.   HENT:      Head: Normocephalic and atraumatic.   Eyes:      General: No scleral icterus.     Conjunctiva/sclera: Conjunctivae normal.      Pupils: Pupils are equal, round, and reactive to light.   Neck:      Thyroid: No thyromegaly.      Trachea: No tracheal deviation.   Cardiovascular:      Rate and Rhythm: Normal rate and regular rhythm.      Heart sounds: Normal heart sounds.   Pulmonary:      Effort: Pulmonary effort is normal. No respiratory distress.      Breath sounds: Normal breath sounds.   Abdominal:      General: Bowel sounds are normal.      Palpations: Abdomen is soft.      Tenderness: There is no abdominal tenderness.   Musculoskeletal:         General: Normal range of motion.      Cervical back: Normal range of motion and neck supple.   Lymphadenopathy:      Cervical: No cervical adenopathy.   Skin:     General: Skin is warm and dry.      Findings: No erythema or rash.   Neurological:      Mental Status: She is alert and oriented to person, place, and time.      Motor: No abnormal muscle tone.      Coordination: Coordination normal.   Psychiatric:         Speech: " Speech normal.         Behavior: Behavior normal.         Judgment: Judgment normal.         PFTs:  Performed in clinic and personally reviewed.   There is no airway obstruction.  The lung volumes are normal.  The DLCO is normal.     Imaging:  CT chest from 7/12/24 reviewed. There is no PE. The lungs are clear bilaterally.     Assessment & Plan   Diagnoses and all orders for this visit:    1. Severe persistent asthma without complication (Primary)  -     CBC & Differential  -     Aspergillus Fumigatus IgE  -     Allergens, Zone 8  -     IgE Level  -     Breathing Capacity Test    Other orders  -     Albuterol-Budesonide (Airsupra) 90-80 MCG/ACT aerosol; Inhale 2 puffs Every 6 (Six) Hours As Needed (Wheezing, shortness of breath).  Dispense: 5.9 g; Refill: 11        Discussion:  Ms. Langford is a 53yo F who is referred for asthma.     1. Severe Persistent Asthma  - Continue Breztri.   - Rx for Airsupra rescue inhaler rather than Albuterol.   - Check labs today though will likely be normal as she has been on Fasenra.   - We will try and change Fasenra to Tezspire as she previously had an elevated IgE as well as eosinophil level and continues to have exacerbations despite Fasenra.     2. Allergy  - Continue Allegra and Singulair.     Follow up in March. Call with any questions or concerns.          Aamir Langford  reports that she has never smoked. She has never used smokeless tobacco.     I have spent 61 minutes reviewing the patient record, face to face with the patient in discussion of test results and plans for further management and in documentation and coordination of care. Excluding time spent on other separate services such as performing procedures or test interpretation, if applicable.        Sharmila V Case, DO  Pulmonary and Critical Care Medicine  Note Electronically Signed

## 2024-09-04 LAB
A FUMIGATUS IGE QN: <0.1 KU/L
IGE SERPL-ACNC: 174 IU/ML (ref 6–495)

## 2024-09-05 LAB

## 2024-10-04 ENCOUNTER — TELEPHONE (OUTPATIENT)
Dept: PULMONOLOGY | Facility: CLINIC | Age: 54
End: 2024-10-04
Payer: OTHER GOVERNMENT

## 2024-10-08 ENCOUNTER — TELEPHONE (OUTPATIENT)
Dept: PULMONOLOGY | Facility: CLINIC | Age: 54
End: 2024-10-08
Payer: OTHER GOVERNMENT

## 2024-10-28 ENCOUNTER — SPECIALTY PHARMACY (OUTPATIENT)
Dept: PULMONOLOGY | Facility: CLINIC | Age: 54
End: 2024-10-28
Payer: OTHER GOVERNMENT

## 2024-10-28 PROBLEM — J45.50: Status: ACTIVE | Noted: 2024-10-28

## 2024-10-29 ENCOUNTER — TELEPHONE (OUTPATIENT)
Dept: PULMONOLOGY | Facility: CLINIC | Age: 54
End: 2024-10-29
Payer: OTHER GOVERNMENT

## 2025-02-20 ENCOUNTER — TELEPHONE (OUTPATIENT)
Dept: PULMONOLOGY | Facility: CLINIC | Age: 55
End: 2025-02-20
Payer: OTHER GOVERNMENT

## 2025-02-20 ENCOUNTER — OFFICE VISIT (OUTPATIENT)
Dept: PULMONOLOGY | Facility: CLINIC | Age: 55
End: 2025-02-20
Payer: COMMERCIAL

## 2025-02-20 VITALS
TEMPERATURE: 97.8 F | HEART RATE: 76 BPM | BODY MASS INDEX: 20.92 KG/M2 | HEIGHT: 68 IN | DIASTOLIC BLOOD PRESSURE: 86 MMHG | SYSTOLIC BLOOD PRESSURE: 136 MMHG | OXYGEN SATURATION: 98 % | WEIGHT: 138 LBS

## 2025-02-20 DIAGNOSIS — J45.50 SEVERE PERSISTENT ALLERGIC ASTHMA WITHOUT COMPLICATION: Primary | ICD-10-CM

## 2025-02-20 PROCEDURE — 99214 OFFICE O/P EST MOD 30 MIN: CPT | Performed by: INTERNAL MEDICINE

## 2025-02-20 NOTE — TELEPHONE ENCOUNTER
"CoverMyMeds would not allow electronic PA to be completed. I called [phone] and spoke with a representative. Insurance would not initiate PA for Tezspire (initial drug of choice per provider in fall 2024), Dupixent, or Nucala 2/2 them not being formulary options. The formulary options are Xolair and Fasenra (both of which the patient has already tried and failed). I was told to have the patient fax a handwritten signed and dated statement of \"I'm giving permission to start the appeal process of Tezspire\" and include her Member ID number starting with the letter \"R\" to [phone]. I was then asked to send chart notes to this same fax number. I called the patient and informed her of this, and she verbalized understanding and will send this in. Will be in contact with patient to let her know what we receive.  "

## 2025-02-20 NOTE — PROGRESS NOTES
"Pulmonary Office Follow Up      Subjective   Chief Complaint: Asthma    Aamir Langford is a 54 y.o. female is being seen in follow up for Asthma.     History of Present Illness    Ms. Langford is a 55yo F who is followed for asthma. She was last seen in clinic on 8/28/24.     She returns to clinic today for follow up. Since her last visit, she has remained on Fasenra secondary to insurance issues with trying to switch to Dupixent.     She had COVID 3 weeks ago and has just finished Prednisone. She is starting to recover.     The following portions of the patient's history were reviewed and updated as appropriate: allergies, current medications, past family history, past medical history, past social history, past surgical history and problem list.    Review of Systems  All other systems were reviewed and are negative.  Exceptions are noted in the HPI or above.      Objective   Blood pressure 136/86, pulse 76, temperature 97.8 °F (36.6 °C), height 172.7 cm (68\"), weight 62.6 kg (138 lb), SpO2 98%.  Physical Exam  Vitals and nursing note reviewed.   Constitutional:       General: She is not in acute distress.     Appearance: She is well-developed.   HENT:      Head: Normocephalic and atraumatic.   Eyes:      General: No scleral icterus.     Conjunctiva/sclera: Conjunctivae normal.      Pupils: Pupils are equal, round, and reactive to light.   Neck:      Thyroid: No thyromegaly.      Trachea: No tracheal deviation.   Cardiovascular:      Rate and Rhythm: Normal rate and regular rhythm.      Heart sounds: Normal heart sounds.   Pulmonary:      Effort: Pulmonary effort is normal. No respiratory distress.      Breath sounds: Normal breath sounds.   Abdominal:      General: Bowel sounds are normal.      Palpations: Abdomen is soft.      Tenderness: There is no abdominal tenderness.   Musculoskeletal:         General: Normal range of motion.      Cervical back: Normal range of motion and neck supple.   Lymphadenopathy:      " Cervical: No cervical adenopathy.   Skin:     General: Skin is warm and dry.      Findings: No erythema or rash.   Neurological:      Mental Status: She is alert and oriented to person, place, and time.      Motor: No abnormal muscle tone.      Coordination: Coordination normal.   Psychiatric:         Speech: Speech normal.         Behavior: Behavior normal.         Judgment: Judgment normal.         PFTs:  No new PFTs.     Imaging:  No new imaging.     Assessment & Plan   Diagnoses and all orders for this visit:    1. Severe persistent allergic asthma without complication (Primary)        Discussion:  Ms. Langford is a 53yo F who is followed for asthma.      1. Severe Persistent Asthma  - At least 1 exacerbation since her last visit associated with COVID-19 infection.  - Continue Breztri.   - Rx for Airsupra rescue inhaler rather than Albuterol.   - She is currently still on Fasenra but continues to have exacerbations. We will try and get her switched to another biologic as she continues to require antibiotics and steroids. She has previously tried and failed Xolair and now Fasenra.      2. Allergy  - Continue Allegra and Singulair.     3. Health Maintenance  - We discussed that she will be eligible for a COVID booster 90 days after this past infection.   - We also discussed that she may be eligible for the RSV vaccine as she is over 50 with a comorbid respiratory condition (asthma). I have asked her to call if the pharmacy requires an Rx.     Follow up in May. Call with any questions.          Aamir Langford  reports that she has never smoked. She has never used smokeless tobacco.     Sharmila Adame, DO  Pulmonary and Critical Care Medicine  Note Electronically Signed

## 2025-02-21 ENCOUNTER — TELEPHONE (OUTPATIENT)
Dept: GENERAL RADIOLOGY | Facility: HOSPITAL | Age: 55
End: 2025-02-21
Payer: OTHER GOVERNMENT

## 2025-02-21 NOTE — TELEPHONE ENCOUNTER
Patient has requested appeal from [facility] BCPS plan for Tezspire. Spoke with [facility] representative who will be sending a Formulary Exception Form to possibly approve Tezspire.

## 2025-02-28 ENCOUNTER — TELEPHONE (OUTPATIENT)
Dept: PULMONOLOGY | Facility: CLINIC | Age: 55
End: 2025-02-28
Payer: OTHER GOVERNMENT

## 2025-02-28 NOTE — TELEPHONE ENCOUNTER
Tezspire was denied by primary insurance ([facility] FEP plan). All information for Tezspire PA request including denial from primary plan has been sent to [facility] for review. Patient verbalized understanding.

## 2025-03-04 ENCOUNTER — SPECIALTY PHARMACY (OUTPATIENT)
Dept: GENERAL RADIOLOGY | Facility: HOSPITAL | Age: 55
End: 2025-03-04
Payer: OTHER GOVERNMENT

## 2025-03-04 RX ORDER — TEZEPELUMAB-EKKO 210 MG/1.9ML
1.91 INJECTION, SOLUTION SUBCUTANEOUS
Qty: 1.91 ML | Refills: 11 | Status: SHIPPED | OUTPATIENT
Start: 2025-03-04

## 2025-03-06 ENCOUNTER — TELEPHONE (OUTPATIENT)
Dept: PULMONOLOGY | Facility: CLINIC | Age: 55
End: 2025-03-06
Payer: OTHER GOVERNMENT

## 2025-03-06 NOTE — TELEPHONE ENCOUNTER
Called patient to inform we are still in process of trying to get Tezspire approved. Patient verbalized understanding.

## 2025-03-17 ENCOUNTER — TELEPHONE (OUTPATIENT)
Dept: PULMONOLOGY | Facility: CLINIC | Age: 55
End: 2025-03-17
Payer: OTHER GOVERNMENT

## 2025-03-17 NOTE — TELEPHONE ENCOUNTER
Called patient to let her know that  still has not approved Tezspire. She may have to continue with Dupixent (approval on file). Patient verbalized understanding. She has appt 3/24/25 at 1100 to get initial Dupixent dose.

## 2025-03-24 ENCOUNTER — CLINICAL SUPPORT (OUTPATIENT)
Dept: PULMONOLOGY | Facility: CLINIC | Age: 55
End: 2025-03-24
Payer: COMMERCIAL

## 2025-03-24 DIAGNOSIS — J45.50 SEVERE PERSISTENT ALLERGIC ASTHMA WITHOUT COMPLICATION: Primary | ICD-10-CM

## 2025-03-24 PROCEDURE — 96372 THER/PROPH/DIAG INJ SC/IM: CPT | Performed by: INTERNAL MEDICINE

## 2025-03-24 NOTE — PROGRESS NOTES
Dupixent 600mg SQ left ARM. Tolerated well. Patient supplied.  Patient education completed and verbalized understanding.

## 2025-03-25 ENCOUNTER — TELEPHONE (OUTPATIENT)
Dept: PULMONOLOGY | Facility: CLINIC | Age: 55
End: 2025-03-25
Payer: COMMERCIAL

## 2025-03-25 ENCOUNTER — SPECIALTY PHARMACY (OUTPATIENT)
Dept: PULMONOLOGY | Facility: CLINIC | Age: 55
End: 2025-03-25
Payer: COMMERCIAL

## 2025-03-25 NOTE — TELEPHONE ENCOUNTER
Patient received loading dose of dupixent in office on 3/24/25. Insurance adamantly denies Tezspire. She still has Dupixent approval. Will continue with Dupixent at this time and pursue Tezspire approval in future if Dupixent is non-efficacious. Patient verbalized understanding and will reach out if she needs any further assistance. Insurance requires her to fill Dupixent with [facility].

## 2025-05-07 ENCOUNTER — TELEPHONE (OUTPATIENT)
Dept: PULMONOLOGY | Facility: CLINIC | Age: 55
End: 2025-05-07
Payer: COMMERCIAL

## 2025-05-07 ENCOUNTER — SPECIALTY PHARMACY (OUTPATIENT)
Dept: PULMONOLOGY | Facility: CLINIC | Age: 55
End: 2025-05-07
Payer: COMMERCIAL

## 2025-05-07 NOTE — TELEPHONE ENCOUNTER
Patient called in re: Dupixent. [facility] statin they do not have active prescription despite showing active and sent in November 2024 on our end. I re-sent Dupixent prescription electronically today. Patient will be in [place] for one month starting next week--office will provide samples for this time.She will  on Friday 5/9. Patient verbalized understanding.

## 2025-05-08 RX ORDER — DUPILUMAB 300 MG/2ML
300 INJECTION, SOLUTION SUBCUTANEOUS ONCE
Qty: 2 ML | Refills: 0 | COMMUNITY
Start: 2025-05-08 | End: 2025-05-08

## 2025-05-08 NOTE — TELEPHONE ENCOUNTER
Dupixewo boxes:  3 boxes   LOT [id number] EXP 07/31/26 and one box LOT [id number] EXP 12/31/ 260 Samples given

## (undated) DEVICE — SUTURE MCRYL SZ 2-0 L36IN ABSRB UD L36MM CT-1 1/2 CIR Y945H

## (undated) DEVICE — BNDG CMPR ELAS KNT VEL 6INX5YD -- MEDICHOICE

## (undated) DEVICE — SOLUTION IV 1000ML 0.9% SOD CHL

## (undated) DEVICE — PACK SURG BSHR TOT KNEE LF

## (undated) DEVICE — SHEET,DRAPE,70X100,STERILE: Brand: MEDLINE

## (undated) DEVICE — SUTURE ABSORBABLE BRAIDED 2-0 CT-1 27 IN UD VICRYL J259H

## (undated) DEVICE — SUTURE VCRL SZ 1 L27IN ABSRB VLT CTX L48MM 1 2 CIR SGL ARMED J365H

## (undated) DEVICE — DECANTER BAG 9": Brand: MEDLINE INDUSTRIES, INC.

## (undated) DEVICE — PREMIUM DRY TRAY LF: Brand: MEDLINE INDUSTRIES, INC.

## (undated) DEVICE — ZIPPERED TOGA, X-LARGE: Brand: FLYTE

## (undated) DEVICE — KIT CLN UP BON SECOURS MARYV

## (undated) DEVICE — BNDG,ELSTC,MATRIX,STRL,6"X5YD,LF,HOOK&LP: Brand: MEDLINE

## (undated) DEVICE — BLANKET WRM AD W50XL85.8IN PACU FULL BODY FORC AIR

## (undated) DEVICE — SYR 50ML LR LCK 1ML GRAD NSAF --

## (undated) DEVICE — HANDPIECE SET WITH HIGH FLOW TIP AND SUCTION TUBE: Brand: INTERPULSE

## (undated) DEVICE — SMOKE EVACUATION PENCIL: Brand: VALLEYLAB

## (undated) DEVICE — SUTURE ABSORBABLE MONOFILAMENT 3-0 PS-2 27 IN UD MONOCRYL + SXMP1B109

## (undated) DEVICE — SYSTEM SKIN CLSR 60CM 2-OCTYL CYNOACRLT W/ MESH DISPNS

## (undated) DEVICE — SOFT SILICONE HYDROCELLULAR SACRUM DRESSING WITH LOCK AWAY LAYER: Brand: ALLEVYN LIFE SACRUM (LARGE) PACK OF 10

## (undated) DEVICE — PADDING CAST W6INXL4YD ST COT COHESIVE HND TEARABLE SPEC

## (undated) DEVICE — BIT DRL QR TOT KNEE 1/8IN SS -- DISP STRL 2/PK

## (undated) DEVICE — Device: Brand: JELCO

## (undated) DEVICE — SOLUTION IRRIG 3000ML 0.9% SOD CHL FLX CONT 0797208] ICU MEDICAL INC]

## (undated) DEVICE — 4-PORT MANIFOLD: Brand: NEPTUNE 2

## (undated) DEVICE — TOWEL: OR BLU 80/CS: Brand: MEDICAL ACTION INDUSTRIES

## (undated) DEVICE — SUTURE VCRL SZ 1 L18IN ABSRB VLT CTX L48MM 1/2 CIR J765D

## (undated) DEVICE — HEADLESS TROCHAR PIN 75MM: Brand: ZUK

## (undated) DEVICE — NEEDLE SPNL 18GA L3.5IN W/ QNCKE SHARPER BVL DURA CLICK

## (undated) DEVICE — HOOD WITH PEEL AWAY FACE SHIELD: Brand: T7PLUS

## (undated) DEVICE — 1010 S-DRAPE TOWEL DRAPE 10/BX: Brand: STERI-DRAPE™

## (undated) DEVICE — PREP SKN CHLRAPRP APL 26ML STR --

## (undated) DEVICE — SUTURE VCRL SZ 2 L27IN ABSRB VLT L65MM TP-1 1/2 CIR J649G

## (undated) DEVICE — 2C14 #2 PDO 36 X 36: Brand: 2C14 #2 PDO 36 X 36

## (undated) DEVICE — STRYKER PERFORMANCE SERIES SAGITTAL BLADE: Brand: STRYKER PERFORMANCE SERIES

## (undated) DEVICE — REM POLYHESIVE ADULT PATIENT RETURN ELECTRODE: Brand: VALLEYLAB

## (undated) DEVICE — INTENDED FOR TISSUE SEPARATION, AND OTHER PROCEDURES THAT REQUIRE A SHARP SURGICAL BLADE TO PUNCTURE OR CUT.: Brand: BARD-PARKER SAFETY BLADES SIZE 10, STERILE

## (undated) DEVICE — GARMENT,MEDLINE,DVT,INT,CALF,MED, GEN2: Brand: MEDLINE

## (undated) DEVICE — 2108 SERIES SAGITTAL BLADE (20.7 X 0.88 X 85.0MM)

## (undated) DEVICE — SUTURE MCRYL SZ 3-0 L18IN ABSRB UD L19MM PS-2 3/8 CIR PRIM Y497G

## (undated) DEVICE — BOWL UTIL GRAD 32OZ STRL --

## (undated) DEVICE — GLOVE SURG SZ 8 CRM LTX FREE POLYISOPRENE POLYMER BEAD ANTI

## (undated) DEVICE — TAPE,CLOTH/SILK,CURAD,3"X10YD,LF,40/CS: Brand: CURAD

## (undated) DEVICE — BLADE, TONGUE, 6", STERILE: Brand: MEDLINE

## (undated) DEVICE — SUTURE STRATAFIX SYMMETRIC SZ 1 L18IN ABSRB VLT CT1 L36CM SXPP1A404

## (undated) DEVICE — DRAPE TWL SURG 16X26IN BLU ORB04] ALLCARE INC]

## (undated) DEVICE — 3M™ MICROFOAM™ SURGICAL TAPE 4 ROLLS/CARTON 6 CARTONS/CASE 1528-3: Brand: 3M™ MICROFOAM™

## (undated) DEVICE — SOLUTION,WATER,IRRIGATION,1000ML,STERILE: Brand: MEDLINE